# Patient Record
Sex: MALE | Race: OTHER | Employment: UNEMPLOYED | URBAN - NONMETROPOLITAN AREA
[De-identification: names, ages, dates, MRNs, and addresses within clinical notes are randomized per-mention and may not be internally consistent; named-entity substitution may affect disease eponyms.]

---

## 2022-05-02 ENCOUNTER — HOSPITAL ENCOUNTER (EMERGENCY)
Age: 32
Discharge: HOME | End: 2022-05-02
Attending: STUDENT IN AN ORGANIZED HEALTH CARE EDUCATION/TRAINING PROGRAM
Payer: COMMERCIAL

## 2022-05-02 ENCOUNTER — APPOINTMENT (OUTPATIENT)
Dept: GENERAL RADIOLOGY | Age: 32
End: 2022-05-02
Payer: COMMERCIAL

## 2022-05-02 ENCOUNTER — HOSPITAL ENCOUNTER (OUTPATIENT)
Age: 32
Setting detail: OBSERVATION
Discharge: NURSING, REHAB OR LONG-TERM CARE | DRG: 861 | End: 2022-05-04
Attending: INTERNAL MEDICINE | Admitting: INTERNAL MEDICINE
Payer: COMMERCIAL

## 2022-05-02 VITALS
WEIGHT: 220 LBS | SYSTOLIC BLOOD PRESSURE: 122 MMHG | TEMPERATURE: 98.4 F | HEART RATE: 106 BPM | OXYGEN SATURATION: 93 % | HEIGHT: 67 IN | DIASTOLIC BLOOD PRESSURE: 68 MMHG | BODY MASS INDEX: 34.53 KG/M2 | RESPIRATION RATE: 21 BRPM

## 2022-05-02 DIAGNOSIS — R29.898 LEG WEAKNESS, BILATERAL: ICD-10-CM

## 2022-05-02 DIAGNOSIS — T40.601A OPIATE OVERDOSE, ACCIDENTAL OR UNINTENTIONAL, INITIAL ENCOUNTER (HCC): Primary | ICD-10-CM

## 2022-05-02 DIAGNOSIS — N17.9 ACUTE KIDNEY INJURY (HCC): ICD-10-CM

## 2022-05-02 DIAGNOSIS — R77.8 ELEVATED TROPONIN: ICD-10-CM

## 2022-05-02 PROBLEM — E66.9 OBESITY: Status: ACTIVE | Noted: 2022-05-02

## 2022-05-02 PROBLEM — T40.411A ACCIDENTAL FENTANYL OVERDOSE (HCC): Status: ACTIVE | Noted: 2022-05-02

## 2022-05-02 PROBLEM — T40.2X1A OPIOID OVERDOSE (HCC): Status: ACTIVE | Noted: 2022-05-02

## 2022-05-02 PROBLEM — Z72.0 TOBACCO USE: Status: ACTIVE | Noted: 2022-05-02

## 2022-05-02 LAB
A/G RATIO: 1.3 (ref 1.1–2.2)
A/G RATIO: 1.3 (ref 1.1–2.2)
ALBUMIN SERPL-MCNC: 4.3 G/DL (ref 3.4–5)
ALBUMIN SERPL-MCNC: 4.5 G/DL (ref 3.4–5)
ALP BLD-CCNC: 125 U/L (ref 40–129)
ALP BLD-CCNC: 150 U/L (ref 40–129)
ALT SERPL-CCNC: 278 U/L (ref 10–40)
ALT SERPL-CCNC: 319 U/L (ref 10–40)
AMPHETAMINE SCREEN, URINE: NORMAL
ANION GAP SERPL CALCULATED.3IONS-SCNC: 10 MMOL/L (ref 3–16)
ANION GAP SERPL CALCULATED.3IONS-SCNC: 17 MMOL/L (ref 3–16)
AST SERPL-CCNC: 196 U/L (ref 15–37)
AST SERPL-CCNC: 278 U/L (ref 15–37)
BACTERIA: ABNORMAL /HPF
BARBITURATE SCREEN URINE: NORMAL
BASOPHILS ABSOLUTE: 0 K/UL (ref 0–0.2)
BASOPHILS RELATIVE PERCENT: 0.1 %
BENZODIAZEPINE SCREEN, URINE: NORMAL
BILIRUB SERPL-MCNC: 0.3 MG/DL (ref 0–1)
BILIRUB SERPL-MCNC: 0.3 MG/DL (ref 0–1)
BILIRUBIN URINE: NEGATIVE
BLOOD, URINE: ABNORMAL
BUN BLDV-MCNC: 14 MG/DL (ref 7–20)
BUN BLDV-MCNC: 15 MG/DL (ref 7–20)
CALCIUM SERPL-MCNC: 8.9 MG/DL (ref 8.3–10.6)
CALCIUM SERPL-MCNC: 9.1 MG/DL (ref 8.3–10.6)
CANNABINOID SCREEN URINE: NORMAL
CHLORIDE BLD-SCNC: 100 MMOL/L (ref 99–110)
CHLORIDE BLD-SCNC: 96 MMOL/L (ref 99–110)
CLARITY: CLEAR
CO2: 24 MMOL/L (ref 21–32)
CO2: 28 MMOL/L (ref 21–32)
COCAINE METABOLITE SCREEN URINE: NORMAL
COLOR: ABNORMAL
CREAT SERPL-MCNC: 1.2 MG/DL (ref 0.9–1.3)
CREAT SERPL-MCNC: 1.8 MG/DL (ref 0.9–1.3)
EKG ATRIAL RATE: 112 BPM
EKG DIAGNOSIS: NORMAL
EKG P AXIS: 59 DEGREES
EKG P-R INTERVAL: 140 MS
EKG Q-T INTERVAL: 328 MS
EKG QRS DURATION: 86 MS
EKG QTC CALCULATION (BAZETT): 447 MS
EKG R AXIS: 36 DEGREES
EKG T AXIS: 20 DEGREES
EKG VENTRICULAR RATE: 112 BPM
EOSINOPHILS ABSOLUTE: 0 K/UL (ref 0–0.6)
EOSINOPHILS RELATIVE PERCENT: 0.1 %
EPITHELIAL CELLS, UA: ABNORMAL /HPF (ref 0–5)
GFR AFRICAN AMERICAN: 53
GFR AFRICAN AMERICAN: >60
GFR NON-AFRICAN AMERICAN: 44
GFR NON-AFRICAN AMERICAN: >60
GLUCOSE BLD-MCNC: 110 MG/DL (ref 70–99)
GLUCOSE BLD-MCNC: 312 MG/DL (ref 70–99)
GLUCOSE URINE: NEGATIVE MG/DL
HCT VFR BLD CALC: 46.4 % (ref 40.5–52.5)
HEMOGLOBIN: 15.4 G/DL (ref 13.5–17.5)
KETONES, URINE: NEGATIVE MG/DL
LEUKOCYTE ESTERASE, URINE: NEGATIVE
LYMPHOCYTES ABSOLUTE: 0.7 K/UL (ref 1–5.1)
LYMPHOCYTES RELATIVE PERCENT: 6 %
Lab: NORMAL
MCH RBC QN AUTO: 31.7 PG (ref 26–34)
MCHC RBC AUTO-ENTMCNC: 33.2 G/DL (ref 31–36)
MCV RBC AUTO: 95.4 FL (ref 80–100)
METHADONE SCREEN, URINE: NORMAL
MICROSCOPIC EXAMINATION: YES
MONOCYTES ABSOLUTE: 0.2 K/UL (ref 0–1.3)
MONOCYTES RELATIVE PERCENT: 1.5 %
MUCUS: ABNORMAL /LPF
NEUTROPHILS ABSOLUTE: 11.1 K/UL (ref 1.7–7.7)
NEUTROPHILS RELATIVE PERCENT: 92.3 %
NITRITE, URINE: NEGATIVE
OPIATE SCREEN URINE: NORMAL
OXYCODONE URINE: NORMAL
PDW BLD-RTO: 13.8 % (ref 12.4–15.4)
PH UA: 6
PH UA: 6 (ref 5–8)
PHENCYCLIDINE SCREEN URINE: NORMAL
PLATELET # BLD: 147 K/UL (ref 135–450)
PMV BLD AUTO: 11.7 FL (ref 5–10.5)
POTASSIUM REFLEX MAGNESIUM: 4.7 MMOL/L (ref 3.5–5.1)
POTASSIUM REFLEX MAGNESIUM: 5.4 MMOL/L (ref 3.5–5.1)
PROPOXYPHENE SCREEN: NORMAL
PROTEIN UA: 30 MG/DL
RBC # BLD: 4.87 M/UL (ref 4.2–5.9)
RBC UA: ABNORMAL /HPF (ref 0–4)
SARS-COV-2, NAAT: NOT DETECTED
SODIUM BLD-SCNC: 137 MMOL/L (ref 136–145)
SODIUM BLD-SCNC: 138 MMOL/L (ref 136–145)
SPECIFIC GRAVITY UA: >=1.03 (ref 1–1.03)
TOTAL CK: 175 U/L (ref 39–308)
TOTAL PROTEIN: 7.7 G/DL (ref 6.4–8.2)
TOTAL PROTEIN: 8 G/DL (ref 6.4–8.2)
TROPONIN: 0.1 NG/ML
TROPONIN: 0.13 NG/ML
URINE TYPE: ABNORMAL
UROBILINOGEN, URINE: 1 E.U./DL
WBC # BLD: 12.1 K/UL (ref 4–11)
WBC UA: ABNORMAL /HPF (ref 0–5)

## 2022-05-02 PROCEDURE — 96360 HYDRATION IV INFUSION INIT: CPT

## 2022-05-02 PROCEDURE — G0379 DIRECT REFER HOSPITAL OBSERV: HCPCS

## 2022-05-02 PROCEDURE — 72100 X-RAY EXAM L-S SPINE 2/3 VWS: CPT

## 2022-05-02 PROCEDURE — 81001 URINALYSIS AUTO W/SCOPE: CPT

## 2022-05-02 PROCEDURE — 99285 EMERGENCY DEPT VISIT HI MDM: CPT

## 2022-05-02 PROCEDURE — 1200000000 HC SEMI PRIVATE

## 2022-05-02 PROCEDURE — 2580000003 HC RX 258: Performed by: STUDENT IN AN ORGANIZED HEALTH CARE EDUCATION/TRAINING PROGRAM

## 2022-05-02 PROCEDURE — 84484 ASSAY OF TROPONIN QUANT: CPT

## 2022-05-02 PROCEDURE — 80053 COMPREHEN METABOLIC PANEL: CPT

## 2022-05-02 PROCEDURE — 85025 COMPLETE CBC W/AUTO DIFF WBC: CPT

## 2022-05-02 PROCEDURE — 36415 COLL VENOUS BLD VENIPUNCTURE: CPT

## 2022-05-02 PROCEDURE — G0378 HOSPITAL OBSERVATION PER HR: HCPCS

## 2022-05-02 PROCEDURE — 6370000000 HC RX 637 (ALT 250 FOR IP): Performed by: STUDENT IN AN ORGANIZED HEALTH CARE EDUCATION/TRAINING PROGRAM

## 2022-05-02 PROCEDURE — 87635 SARS-COV-2 COVID-19 AMP PRB: CPT

## 2022-05-02 PROCEDURE — 93005 ELECTROCARDIOGRAM TRACING: CPT | Performed by: STUDENT IN AN ORGANIZED HEALTH CARE EDUCATION/TRAINING PROGRAM

## 2022-05-02 PROCEDURE — 73502 X-RAY EXAM HIP UNI 2-3 VIEWS: CPT

## 2022-05-02 PROCEDURE — 82550 ASSAY OF CK (CPK): CPT

## 2022-05-02 PROCEDURE — 80307 DRUG TEST PRSMV CHEM ANLYZR: CPT

## 2022-05-02 RX ORDER — ENOXAPARIN SODIUM 100 MG/ML
40 INJECTION SUBCUTANEOUS DAILY
Status: DISCONTINUED | OUTPATIENT
Start: 2022-05-03 | End: 2022-05-04 | Stop reason: HOSPADM

## 2022-05-02 RX ORDER — ACETAMINOPHEN 650 MG/1
650 SUPPOSITORY RECTAL EVERY 6 HOURS PRN
Status: DISCONTINUED | OUTPATIENT
Start: 2022-05-02 | End: 2022-05-04 | Stop reason: HOSPADM

## 2022-05-02 RX ORDER — ACETAMINOPHEN 325 MG/1
650 TABLET ORAL EVERY 6 HOURS PRN
Status: DISCONTINUED | OUTPATIENT
Start: 2022-05-02 | End: 2022-05-04 | Stop reason: HOSPADM

## 2022-05-02 RX ORDER — SODIUM CHLORIDE 0.9 % (FLUSH) 0.9 %
5-40 SYRINGE (ML) INJECTION PRN
Status: DISCONTINUED | OUTPATIENT
Start: 2022-05-02 | End: 2022-05-04 | Stop reason: HOSPADM

## 2022-05-02 RX ORDER — ONDANSETRON 2 MG/ML
4 INJECTION INTRAMUSCULAR; INTRAVENOUS EVERY 6 HOURS PRN
Status: DISCONTINUED | OUTPATIENT
Start: 2022-05-02 | End: 2022-05-04 | Stop reason: HOSPADM

## 2022-05-02 RX ORDER — SODIUM CHLORIDE 0.9 % (FLUSH) 0.9 %
5-40 SYRINGE (ML) INJECTION EVERY 12 HOURS SCHEDULED
Status: DISCONTINUED | OUTPATIENT
Start: 2022-05-03 | End: 2022-05-04 | Stop reason: HOSPADM

## 2022-05-02 RX ORDER — SODIUM CHLORIDE 9 MG/ML
INJECTION, SOLUTION INTRAVENOUS CONTINUOUS
Status: ACTIVE | OUTPATIENT
Start: 2022-05-03 | End: 2022-05-03

## 2022-05-02 RX ORDER — POLYETHYLENE GLYCOL 3350 17 G/17G
17 POWDER, FOR SOLUTION ORAL DAILY PRN
Status: DISCONTINUED | OUTPATIENT
Start: 2022-05-02 | End: 2022-05-04 | Stop reason: HOSPADM

## 2022-05-02 RX ORDER — ASPIRIN 81 MG/1
324 TABLET, CHEWABLE ORAL ONCE
Status: COMPLETED | OUTPATIENT
Start: 2022-05-02 | End: 2022-05-02

## 2022-05-02 RX ORDER — 0.9 % SODIUM CHLORIDE 0.9 %
1000 INTRAVENOUS SOLUTION INTRAVENOUS ONCE
Status: COMPLETED | OUTPATIENT
Start: 2022-05-02 | End: 2022-05-02

## 2022-05-02 RX ORDER — SODIUM CHLORIDE 9 MG/ML
INJECTION, SOLUTION INTRAVENOUS PRN
Status: DISCONTINUED | OUTPATIENT
Start: 2022-05-02 | End: 2022-05-04

## 2022-05-02 RX ORDER — NICOTINE 21 MG/24HR
1 PATCH, TRANSDERMAL 24 HOURS TRANSDERMAL DAILY
Status: DISCONTINUED | OUTPATIENT
Start: 2022-05-03 | End: 2022-05-04 | Stop reason: HOSPADM

## 2022-05-02 RX ORDER — ONDANSETRON 4 MG/1
4 TABLET, ORALLY DISINTEGRATING ORAL EVERY 8 HOURS PRN
Status: DISCONTINUED | OUTPATIENT
Start: 2022-05-02 | End: 2022-05-04 | Stop reason: HOSPADM

## 2022-05-02 RX ADMIN — SODIUM CHLORIDE 1000 ML: 9 INJECTION, SOLUTION INTRAVENOUS at 17:04

## 2022-05-02 RX ADMIN — ASPIRIN 81 MG 324 MG: 81 TABLET ORAL at 17:05

## 2022-05-02 ASSESSMENT — PAIN SCALES - GENERAL: PAINLEVEL_OUTOF10: 7

## 2022-05-02 ASSESSMENT — PAIN - FUNCTIONAL ASSESSMENT: PAIN_FUNCTIONAL_ASSESSMENT: NONE - DENIES PAIN

## 2022-05-02 ASSESSMENT — PAIN DESCRIPTION - LOCATION: LOCATION: LEG

## 2022-05-02 ASSESSMENT — PAIN DESCRIPTION - ORIENTATION: ORIENTATION: RIGHT;LEFT

## 2022-05-02 NOTE — ED NOTES
Attempted to assist pt to lobby to be picked up by [facility] representative. Pt states, \"I can't walk\". Attempted to help pt walk but pt continues to sit in the floor. MD made aware. MD states she will place further orders.      Marielle Chan RN  05/02/22 0911

## 2022-05-02 NOTE — ED PROVIDER NOTES
[address] [place]    CHIEF COMPLAINT  Drug Overdose (pt states he took unknown amount of Fentanyl today in an attempt to get high, denies SI, found unresponsive, given 3.5 of Narcan by EMS, states he has appt with [facility] at 3pm today)     [address] Sanjeev Richardson is a 32 y.o. male  who presents to the ED complaining of fentanyl overdose. Patient states that he took an unknown amount of fentanyl in an attempt to get high. He denies any attempt at self-harm or suicidal ideation. He was found unresponsive was given 3-1/2 of Narcan by EMS. He denies any other drug use or coingestions. He denies any other complaints or concerns. No other complaints, modifying factors or associated symptoms. I have reviewed the following from the nursing documentation. History reviewed. No pertinent past medical history. History reviewed. No pertinent surgical history. History reviewed. No pertinent family history. Social History     Socioeconomic History    Marital status: Legally      Spouse name: Not on file    Number of children: Not on file    Years of education: Not on file    Highest education level: Not on file   Occupational History    Not on file   Tobacco Use    Smoking status: Current Every Day Smoker    Smokeless tobacco: Never Used   Substance and Sexual Activity    Alcohol use: Not Currently    Drug use: Yes     Comment: Fentanyl today 5/2/22    Sexual activity: Not on file   Other Topics Concern    Not on file   Social History Narrative    Not on file     Social Determinants of Health     Financial Resource Strain:     Difficulty of Paying Living Expenses: Not on file   Food Insecurity:     Worried About Running Out of Food in the Last Year: Not on file    Diego of Food in the Last Year: Not on file   Transportation Needs:     Lack of Transportation (Medical): Not on file    Lack of Transportation (Non-Medical):  Not on file   Physical Activity:     Days of Exercise per Week: Not on file    Minutes of Exercise per Session: Not on file   Stress:     Feeling of Stress : Not on file   Social Connections:     Frequency of Communication with Friends and Family: Not on file    Frequency of Social Gatherings with Friends and Family: Not on file    Attends Gnosticism Services: Not on file    Active Member of [address] [place] [address] [employer] or Organizations: Not on file    Attends Club or Organization Meetings: Not on file    Marital Status: Not on file   Intimate Partner Violence:     Fear of Current or Ex-Partner: Not on file    Emotionally Abused: Not on file    Physically Abused: Not on file    Sexually Abused: Not on file   Housing Stability:     Unable to Pay for Housing in the Last Year: Not on file    Number of [place] in the Last Year: Not on file    Unstable Housing in the Last Year: Not on file     No current facility-administered medications for this encounter. Current Outpatient Medications   Medication Sig Dispense Refill    Naloxone HCl (NALOXONE OPIATE OVERDOSE KIT) 1 each by Nasal route once for 1 dose 1 kit 0     No Known Allergies    REVIEW OF SYSTEMS  10 systems reviewed, pertinent positives per HPI otherwise noted to be negative. PHYSICAL EXAM  /66   Pulse 93   Temp 98.4 °F (36.9 °C) (Oral)   Resp 16   Ht 5' 7\" (1.702 m)   Wt 220 lb (99.8 kg)   SpO2 93%   BMI 34.46 kg/m²    GENERAL APPEARANCE: Awake and alert. Cooperative. No acute distress. HENT: Normocephalic. Atraumatic. NECK: Supple. EYES: PERRL. EOM's grossly intact. HEART/CHEST: Tachycardic. No murmurs. LUNGS: Respirations unlabored. CTAB. Good air exchange. Speaking comfortably in full sentences. ABDOMEN: No tenderness. Soft. Non-distended. No masses. No organomegaly. No guarding or rebound. MUSCULOSKELETAL: No extremity edema. Compartments soft. No deformity. No tenderness in the extremities. All extremities neurovascularly intact.   SKIN: Warm and dry. No acute rashes. NEUROLOGICAL: Alert and oriented. CN's 2-12 intact. No gross facial drooping. Strength 5/5, sensation intact. Gait normal.  PSYCHIATRIC: Normal mood and affect. LABS  I have reviewed all labs for this visit. Results for orders placed or performed during the hospital encounter of 05/02/22   EKG 12 Lead   Result Value Ref Range    Ventricular Rate 112 BPM    Atrial Rate 112 BPM    P-R Interval 140 ms    QRS Duration 86 ms    Q-T Interval 328 ms    QTc Calculation (Bazett) 447 ms    P Axis 59 degrees    R Axis 36 degrees    T Axis 20 degrees    Diagnosis Sinus tachycardiaOtherwise normal ECG        ECG  The Ekg interpreted by me shows  sinus tachycardia, [id number]  Axis is   Normal  QTc is  normal  Intervals and Durations are unremarkable. ST Segments: nonspecific changes, nonspecific T wave inversion in lead III  No previous available for comparison    RADIOLOGY  No orders to display     ED COURSE/MDM  Patient seen and evaluated. Old records reviewed. Labs and imaging reviewed and results discussed with patient. Patient is a 57-year-old male brought in with concerns for unintentional fentanyl overdose. Full HPI as detailed above. Upon arrival in the ED, vitals reassuring. Patient is resting comfortably and is in no acute distress. Did receive Narcan prior to arrival.  He denies any attempts at self-harm or suicidal thoughts. EKG was performed that showed sinus tachycardia, otherwise reassuring. No evidence of acute ischemia or dysrhythmias. Patient denies any coingestions. He was monitored for several hours after Narcan administration and did not require any additional repeat doses. Initial plan was for discharge, however when patient was given his discharge paperwork he stated that he was unable to ambulate because he felt like his \"legs were asleep. \"  His neurologic exam is normal.  However, given his complaints of weakness in his legs, work-up was obtained that did reveal an initial troponin of 0.1, with a repeat of 0.13. Also found to have elevated creatinine of 1.8 that did decrease to 1.2 after fluid administration. CK is in the normal range at 175. Given his elevated troponin, and subjective weakness of his lower extremity/inability to walk, patient will be transferred for further work-up and treatment of his condition. He is comfortable in agreement with plan of care. During the patient's ED course, the patient was given:  Medications - No data to display     CLINICAL IMPRESSION  1. Opiate overdose, accidental or unintentional, initial encounter ([facility] [address]        Blood pressure 117/66, pulse 93, temperature 98.4 °F (36.9 °C), temperature source Oral, resp. rate 16, height 5' 7\" (1.702 m), weight 220 lb (99.8 kg), SpO2 93 %. DISPOSITION  Roxene Babinski was transferred to [facility] in stable condition. Patient was given scripts for the following medications. I counseled patient how to take these medications. New Prescriptions    NALOXONE HCL (NALOXONE OPIATE OVERDOSE KIT)    1 each by Nasal route once for 1 dose       Follow-up with:  Erica Alves  [phone]  Schedule an appointment as soon as possible for a visit       [place] [address]. [facility] Emergency Department  [address] [facility],[address]  [phone]  Go to   If symptoms worsen      DISCLAIMER: This chart was created using Dragon dictation software. Efforts were made by me to ensure accuracy, however some errors may be present due to limitations of this technology and occasionally words are not transcribed correctly.        Sailaja Arevalo MD  05/02/22 Taniya [address] Hieu Colindres MD  05/02/22 7650

## 2022-05-03 ENCOUNTER — APPOINTMENT (OUTPATIENT)
Dept: GENERAL RADIOLOGY | Age: 32
DRG: 861 | End: 2022-05-03
Attending: INTERNAL MEDICINE
Payer: COMMERCIAL

## 2022-05-03 PROBLEM — N17.9 AKI (ACUTE KIDNEY INJURY) (HCC): Status: ACTIVE | Noted: 2022-05-03

## 2022-05-03 LAB
ALBUMIN SERPL-MCNC: 3.7 G/DL (ref 3.4–5)
ALP BLD-CCNC: 109 U/L (ref 40–129)
ALT SERPL-CCNC: 272 U/L (ref 10–40)
ANION GAP SERPL CALCULATED.3IONS-SCNC: 8 MMOL/L (ref 3–16)
AST SERPL-CCNC: 305 U/L (ref 15–37)
BASOPHILS ABSOLUTE: 0 K/UL (ref 0–0.2)
BASOPHILS RELATIVE PERCENT: 0.3 %
BILIRUB SERPL-MCNC: 0.3 MG/DL (ref 0–1)
BILIRUBIN DIRECT: <0.2 MG/DL (ref 0–0.3)
BILIRUBIN, INDIRECT: ABNORMAL MG/DL (ref 0–1)
BUN BLDV-MCNC: 9 MG/DL (ref 7–20)
CALCIUM SERPL-MCNC: 9.2 MG/DL (ref 8.3–10.6)
CHLORIDE BLD-SCNC: 102 MMOL/L (ref 99–110)
CO2: 31 MMOL/L (ref 21–32)
CREAT SERPL-MCNC: 0.9 MG/DL (ref 0.9–1.3)
EOSINOPHILS ABSOLUTE: 0.1 K/UL (ref 0–0.6)
EOSINOPHILS RELATIVE PERCENT: 0.6 %
GFR AFRICAN AMERICAN: >60
GFR NON-AFRICAN AMERICAN: >60
GLUCOSE BLD-MCNC: 88 MG/DL (ref 70–99)
HAV IGM SER IA-ACNC: ABNORMAL
HCT VFR BLD CALC: 42.2 % (ref 40.5–52.5)
HEMOGLOBIN: 14.2 G/DL (ref 13.5–17.5)
HEPATITIS B CORE IGM ANTIBODY: ABNORMAL
HEPATITIS B SURFACE ANTIGEN INTERPRETATION: ABNORMAL
HEPATITIS C ANTIBODY INTERPRETATION: REACTIVE
LYMPHOCYTES ABSOLUTE: 3.3 K/UL (ref 1–5.1)
LYMPHOCYTES RELATIVE PERCENT: 37.9 %
MCH RBC QN AUTO: 31.8 PG (ref 26–34)
MCHC RBC AUTO-ENTMCNC: 33.7 G/DL (ref 31–36)
MCV RBC AUTO: 94.3 FL (ref 80–100)
MONOCYTES ABSOLUTE: 0.6 K/UL (ref 0–1.3)
MONOCYTES RELATIVE PERCENT: 7.4 %
NEUTROPHILS ABSOLUTE: 4.6 K/UL (ref 1.7–7.7)
NEUTROPHILS RELATIVE PERCENT: 53.8 %
PDW BLD-RTO: 13.3 % (ref 12.4–15.4)
PLATELET # BLD: 108 K/UL (ref 135–450)
PMV BLD AUTO: 10.1 FL (ref 5–10.5)
POTASSIUM REFLEX MAGNESIUM: 4.6 MMOL/L (ref 3.5–5.1)
RBC # BLD: 4.48 M/UL (ref 4.2–5.9)
SODIUM BLD-SCNC: 141 MMOL/L (ref 136–145)
TOTAL PROTEIN: 7 G/DL (ref 6.4–8.2)
TROPONIN: 0.05 NG/ML
TROPONIN: 0.06 NG/ML
WBC # BLD: 8.6 K/UL (ref 4–11)

## 2022-05-03 PROCEDURE — 97162 PT EVAL MOD COMPLEX 30 MIN: CPT

## 2022-05-03 PROCEDURE — 71045 X-RAY EXAM CHEST 1 VIEW: CPT

## 2022-05-03 PROCEDURE — 2580000003 HC RX 258: Performed by: NURSE PRACTITIONER

## 2022-05-03 PROCEDURE — 96372 THER/PROPH/DIAG INJ SC/IM: CPT

## 2022-05-03 PROCEDURE — 6360000002 HC RX W HCPCS: Performed by: NURSE PRACTITIONER

## 2022-05-03 PROCEDURE — 87522 HEPATITIS C REVRS TRNSCRPJ: CPT

## 2022-05-03 PROCEDURE — 80048 BASIC METABOLIC PNL TOTAL CA: CPT

## 2022-05-03 PROCEDURE — 97166 OT EVAL MOD COMPLEX 45 MIN: CPT

## 2022-05-03 PROCEDURE — 85025 COMPLETE CBC W/AUTO DIFF WBC: CPT

## 2022-05-03 PROCEDURE — 36415 COLL VENOUS BLD VENIPUNCTURE: CPT

## 2022-05-03 PROCEDURE — G0378 HOSPITAL OBSERVATION PER HR: HCPCS

## 2022-05-03 PROCEDURE — 97116 GAIT TRAINING THERAPY: CPT

## 2022-05-03 PROCEDURE — 84484 ASSAY OF TROPONIN QUANT: CPT

## 2022-05-03 PROCEDURE — 97535 SELF CARE MNGMENT TRAINING: CPT

## 2022-05-03 PROCEDURE — 6370000000 HC RX 637 (ALT 250 FOR IP): Performed by: NURSE PRACTITIONER

## 2022-05-03 PROCEDURE — 80076 HEPATIC FUNCTION PANEL: CPT

## 2022-05-03 PROCEDURE — 80074 ACUTE HEPATITIS PANEL: CPT

## 2022-05-03 RX ADMIN — SODIUM CHLORIDE, PRESERVATIVE FREE 10 ML: 5 INJECTION INTRAVENOUS at 21:30

## 2022-05-03 RX ADMIN — SODIUM CHLORIDE: 9 INJECTION, SOLUTION INTRAVENOUS at 00:10

## 2022-05-03 RX ADMIN — ENOXAPARIN SODIUM 40 MG: 100 INJECTION SUBCUTANEOUS at 08:50

## 2022-05-03 RX ADMIN — ASPIRIN 325 MG: 325 TABLET, COATED ORAL at 00:15

## 2022-05-03 ASSESSMENT — PAIN SCALES - GENERAL: PAINLEVEL_OUTOF10: 0

## 2022-05-03 NOTE — PROGRESS NOTES
Pt assessment completed and charted. VSS. Pt a/ox4. Pt still reports numbness in his leg, can feel when you touch the leg. Pt able to stand at bedside and use urinal but needs assistance when ambulating. Pt denies any other needs at this time. Pt calls out appropriately. Pt is a fall risk;  -Bed in lowest position and wheels locked. -Call light within reach.   -Bedside table within reach.   -Non-skid footwear in place.

## 2022-05-03 NOTE — CONSULTS
Consult placed  Added to the treatment team  Who:Dr. Tish Howell  Date:5/3/2022,  Time:2:43 PM        Electronically signed by Dakota Kohli on 5/3/2022 at 2:43 PM

## 2022-05-03 NOTE — CARE COORDINATION
Therapy with recs for IPREHAB. Spoke with charanjit Patricia to speak with patient. Patient agreeable to [facility] any location. , referral to ARU. Spoke with Jose Moon, giovanny at [facility], [phone]. Stated will keep bed available for patient. He is in contact with patients . Patient updated and informed to keep in contact with [facility]. Waiting ARU determination. Aware medically ready. Karissa San RN  ARU can accept will initiate cert. Aware of above plans with [facility].  Anthony Espinoza RN

## 2022-05-03 NOTE — ED NOTES
The transfer center called back with a bed at . [place] [address] bed 1 report can be call to [phone]     Bina Maza  05/02/22 2126

## 2022-05-03 NOTE — CARE COORDINATION
CASE MANAGEMENT INITIAL ASSESSMENT      Reviewed chart and completed assessment with patient:bedside  Order: Assistance with Discharge Planning  Family present: none  Explained Case Management role/services. Primary contact information:[facility] Decision Maker :   Primary Decision Maker: Chely Lundberg - [phone]          Can this person be reached and be able to respond quickly, such as within a few minutes or hours? Yes      Admit date/status:5/2/22  Diagnosis:opioid overdose   Is this a Readmission?:  No      Insurance:caresource   Precert required for SNF: Yes       3 night stay required: No    Living arrangements, Adls, care needs, prior to admission: currently at [facility] in [place]. Planned on admitting to [facility] yesterday did not make it. Durable Medical Equipment at home:  Walker__Cane__RTS__ BSC__Shower Chair__  02__ HHN__ CPAP__  BiPap__  Hospital Bed__ W/C___ Other_____    Services in the home and/or outpatient, prior to admission:none    Current PCP:none provided PCP list for if stays in this area                                Medications Prescription coverage? yes    Transportation needs: likely need cab     PT/OT recs:pending    Hospital Exemption Notification (HEN):needed for SNF    Barriers to discharge:none    Plan/comments:spoke with patient. Reported from [facility] originally. Had been kicked out of prior drug treatment placement. + plan for admission to [facility] for continued treatment. Message left with [facility] to see if still has a bed available. Was supposed to admit there yesterday. Belongings currently at [facility]. Therapy has seen awaiting recs. Following.  Vilma Fournier RN       ECOC on chart for MD signature

## 2022-05-03 NOTE — CARE COORDINATION
[facility] - Acute Rehab Unit   After review, this patient is felt to be:       []  Appropriate for Acute Inpatient Rehab    [x]  Appropriate for Acute Inpatient Rehab Pending Insurance Authorization    []  Not appropriate for Acute Inpatient Rehab    []  Referral received and ARU reviewing patient; Evaluation ongoing. Precert initiated with [facility]. Will notify DCP with further updates.  Thank you for the referral.    Ryan Day  #[id number]  Speech-Language Pathologist  [address] [place] [address] Unit  Desk: [phone]

## 2022-05-03 NOTE — PROGRESS NOTES
Occupational Therapy  Facility/Department: [facility] C3 TELE/MED SURG/ONC  Occupational Therapy Initial Assessment    Name: Janie Snell  : 1990  MRN: [id number]  Date of Service: 5/3/2022    Discharge Recommendations:  IP Rehab  OT Equipment Recommendations  Other: CTA - at current level of function RW. Possible shower chair. Patient Diagnosis(es): There were no encounter diagnoses. Past Medical History:  has no past medical history on file. Past Surgical History:  has no past surgical history on file. Assessment   Performance deficits / Impairments: Decreased functional mobility ; Decreased ADL status; Decreased safe awareness;Decreased cognition;Decreased balance;Decreased sensation;Decreased strength;Decreased ROM (Decreased strength and ROM in LE's, not UE, but impacting function.)    Assessment: Pt is a 27yo male with deficits in the areas listed above after an opioid overdose resulting in LE weakness and decreased sensation and the deficits listed above. Pt typically is (I) at baseline with ADLS and functional mobility and lives in transitional housing. Today, Pt initially Ax2 for functional mobility and t/fs d/t decreased balance and weakness but progressing to min Ax1 with RW with more OOB activity. Pt required min-CGA with OOB ADLS and required vc's for safety and RW use throughout session. Pt would continue to benefit from skilled OT services to increase balance, safety, strength and independence in ADLS and functional mobility. Recommend IPR at d/c to address above deficits and improve function.     Prognosis: Good  Decision Making: Medium Complexity  REQUIRES OT FOLLOW-UP: Yes  Activity Tolerance  Activity Tolerance: Patient Tolerated treatment well        Plan   Plan  Times per Week: 3-5x/week  Current Treatment Recommendations: Strengthening,Balance training,Functional mobility training,Safety education & training,Equipment evaluation, education, & procurement,Self-Care / ADL,Patient/Caregiver education & training     Restrictions  Restrictions/Precautions  Restrictions/Precautions: Up as Tolerated  Position Activity Restriction  Other position/activity restrictions: IV    Subjective   General  Chart Reviewed: Yes  Patient assessed for rehabilitation services?: Yes  Response to previous treatment: Patient with no complaints from previous session  Family / Caregiver Present: No  Referring Practitioner: STEVE Mahoney CNP  Diagnosis: Unresponsiveness in setting of fentanyl overdose  Subjective  Subjective: Pt in bed and agreeable to therapy. General Comment  Comments: RN approved therapy. Pt reporting 8/10 discomfort in B/L LE with greater discomfort in LLE. Intervention: increased activity. Pt still experiencing discomfort. Social/Functional History  Social/Functional History  ADL Assistance: Independent  Homemaking Assistance: Independent  Ambulation Assistance: Independent  Transfer Assistance: Independent  Active : No  Occupation: Unemployed  Additional Comments: Pt reports that he would most likely go to the [facility] in [place] if he were to leave [facility]. Pt has been in a transitional home for ~9 months. Pt is unsure of the layout of the home. Objective   Pulse: 83  Heart Rate Source: Monitor  BP: (!) 157/76  BP Location: Left upper arm  Patient Position: Semi fowlers  MAP (Calculated): 103  Resp: 16  SpO2: 97 %  O2 Device: None (Room air)  Vision Exceptions: Wears glasses at all times  Hearing: Within functional limits          Safety Devices  Type of Devices: Gait belt;Bed alarm in place;Call light within reach;Nurse notified; Left in bed     Bed Mobility Training  Bed Mobility Training: Yes  Overall Level of Assistance: Supervision  Supine to Sit: Supervision (HOB elevated.)  Sit to Supine: Supervision  Scooting: Supervision    Balance  Sitting: Intact  Standing: Impaired  Standing - Static: Poor; Fair (Pt with 1 LOB requiring max+min Ax2 to correct with no AD standing at EOB. Pt CGA-min A in stance for toileting.)  Standing - Dynamic: Constant support;Poor; Fair (Pt initially mod+min Ax2 with RW and progressing to min Ax1 with RW. Activities: in room functional mobility to prepare for household distances, toileting, to/from restroom, hand hygiene. Time: ~2minutes, ~ 2.5minutes. VC's for RW use.)    Transfer Training  Transfer Training: Yes  Sit to Stand: Assist X2;Maximum assistance;Minimum assistance (Pt initially CGA but progressing to max A+min A x2 with no AD d/t LOB. Pt CGA with vc's for safe t/fs with RW.)  Stand to Sit: Assist X2;Maximum assistance;Minimum assistance (Pt initially CGA but progressing to max A+min A x2 with no AD d/t LOB. Pt CGA with vc's for safe t/fs with RW.)     AROM: Within functional limits  PROM: Within functional limits  Strength: Within functional limits  Coordination: Within functional limits  Tone: Normal  Sensation: Impaired (Pt reports N/T in B/L LE, proximal and distal. Pt with great loss of sensation in LLE.)     ADL  Grooming: Minimal assistance (CGA-Juancho to wash hands in stance at sink. Assistance with balance. Vc's for safety with RW.)  Toileting: Minimal assistance (CGA-min A in stance for toileting with LB clothing managment. Assistance with balance and vc's for safety with RW and IV.)                 Cognition  Overall Cognitive Status: Exceptions  Arousal/Alertness: Appropriate responses to stimuli  Following Commands: Follows one step commands with increased time; Follows one step commands with repetition  Attention Span: Appears intact  Memory: Appears intact  Safety Judgement: Decreased awareness of need for assistance;Decreased awareness of need for safety  Problem Solving: Decreased awareness of errors;Assistance required to identify errors made  Insights: Decreased awareness of deficits  Initiation: Does not require cues  Sequencing: Requires cues for some  Cognition Comment: Pt impulsive with decreased safety awareness. Education Given To: Patient  Education Provided: Role of Therapy;Plan of Care;Transfer Training  Education Provided Comments: disease specific: RW use, safety with decreased sensation in B/L LE, general safety during hospitalization, having staff present during mobility, d/c recommendations, POC. Education Method: Verbal  Barriers to Learning: Cognition  Education Outcome: Continued education needed;Verbalized understanding;Demonstrated understanding        AM-PAC Score        AM-PAC Inpatient Daily Activity Raw Score: 17 (05/03/22 1016)  AM-PAC Inpatient ADL T-Scale Score : 37.26 (05/03/22 1016)  ADL Inpatient CMS 0-100% Score: 50.11 (05/03/22 1016)  ADL Inpatient CMS G-Code Modifier : CK (05/03/22 1016)    Goals  Short Term Goals  Time Frame for Short term goals: 1 week (5/10) unless stated otherwise. Short Term Goal 1: Pt will LB dress with min A and AD PRN. Short Term Goal 2: Pt will perform bathroom mobility with SBA and AD PRN. Short Term Goal 3: Pt will perform functional/toilet t/fs with SBA and AD PRN. Short Term Goal 4: Pt will toilet with CGA and AD PRN (5/8). Patient Goals   Patient goals : \"To be able to walk without the walker\"       Therapy Time   Individual Concurrent Group Co-treatment   Time In 0926         Time Out 0950         Minutes 24         Timed Code Treatment Minutes: 14 Minutes (10minute evaluation)       Joceline William OTR/L  If pt discharges prior to next session, this note will serve as discharge summary. See case management note for discharge disposition.

## 2022-05-03 NOTE — PROGRESS NOTES
Pt arrived from [place] [address]. [place]. Oriented to room and call light. Tana Colindres CNP notified of pt's arrival.      Admission assessment complete as charted. - a/o x 4, VSS  - c/o bilateral leg weakness, urinal at bedside  - pain 7/10 in BLE  - lungs CTA in all lobes  - BS active x 4, last BM 05/01/2022    Nonskid footwear on. Bed alarm on. Bed in low position, wheels locked, SR x 2, call light and bedside table within reach. Awaiting admission orders.       Electronically signed by Rebekah Reyes RN on 5/2/2022 at 11:32 PM

## 2022-05-03 NOTE — PROGRESS NOTES
4 Eyes Skin Assessment     The patient is being assess for  Admission    I agree that 2 RN's have performed a thorough Head to Toe Skin Assessment on the patient. ALL assessment sites listed below have been assessed. Areas assessed by both nurses: Nellie Vee / Lexa Pock  [x]   Head, Face, and Ears   [x]   Shoulders, Back, and Chest  [x]   Arms, Elbows, and Hands   [x]   Coccyx, Sacrum, and IschIum  [x]   Legs, Feet, and Heels        Does the Patient have Skin Breakdown?   No         Juan Ramon Prevention initiated:  No   Wound Care Orders initiated:  No      WOC nurse consulted for Pressure Injury (Stage 3,4, Unstageable, DTI, NWPT, and Complex wounds), New and Established Ostomies:  No      Nurse 1 eSignature: Electronically signed by Brian Hunter RN on 5/2/22 at 11:33 PM EDT    **SHARE this note so that the co-signing nurse is able to place an eSignature**    Nurse 2 eSignature: Electronically signed by Letitia Cleaning RN on 5/3/22 at 1:13 AM EDT

## 2022-05-03 NOTE — H&P
Hospital Medicine History & Physical      PCP: No primary care provider on file. Date of Admission: 5/2/2022    Date of Service: Pt seen/examined on 5/2/2022 and Admitted to Inpatient with expected LOS greater than two midnights due to medical therapy. Chief Complaint:  Unresponsiveness     History Of Present Illness:      32 y.o. male, with PMH of opiate abuse, tobacco use and obesity, who was a direct admit from [facility] to [facility] with a fentanyl overdose. History obtained from the patient and review of EMR. The patient stated he snorted an unknown amount of fentanyl today to get high and he \"fell out\". He was found unresponsive and given 3.5mg narcan by EMS. The patient denies any other drug use and/or intent to harm self. Per EMR, the patient was going to be discharged from [facility], but upon receiving his discharge papers he stated he was unable to walk. He stated he felt like his \"legs were asleep\". The patient stated he was told he was \"laying on them funny\" when he was found. Upon further work up the patient was found to have an WANDA with cr of 1.8 and a troponin of 0.10. he was given IVF and his creatinine decreased to baseline of 1.2, but troponin increased to  0.13. the patients CK was checked and resulted at 175. D/t his elevated troponin, the patient was ultimately transferred to [facility] for further evaluation and treatment. He denies any chest pain and/or shortness of breath. troponins will be trended and case management has been consulted for assistance with discharge. The patient denied any other associated symptoms as well as any aggravating and/or alleviating factors. At the time of this assessment, the patient was resting comfortably in bed. He currently denies any chest pain, back pain, abdominal pain, shortness of breath, numbness, tingling, N/V/C/D, fever and/or chills. Past Medical History:      No past medical history on file.     Past Surgical History:      No past surgical history on file. Medications Prior to Admission:      Prior to Admission medications    Medication Sig Start Date End Date Taking? Authorizing Provider   Naloxone HCl (NALOXONE OPIATE OVERDOSE KIT) 1 each by Nasal route once for 1 dose 5/2/22 5/2/22  Mark Shepherd MD     Allergies:  Patient has no known allergies. Social History:      The patient currently lives at home    TOBACCO:   reports that he has been smoking. He has never used smokeless tobacco.  ETOH:   reports previous alcohol use. E-Cigarettes/Vaping Use     Questions Responses    E-Cigarette/Vaping Use     Start Date     Passive Exposure     Quit Date     Counseling Given     Comments         Family History:      No family history on file. REVIEW OF SYSTEMS COMPLETED:   Pertinent positives as noted in the HPI. All other systems reviewed and negative. PHYSICAL EXAM PERFORMED:    BP (!) 161/86   Pulse 77   Temp 98.8 °F (37.1 °C) (Oral)   Resp 18     General appearance:  Pleasant, obese male in no apparent distress, appears stated age and cooperative. HEENT:  Pupils equal, round, and reactive to light. Extra ocular muscles intact. Conjunctivae/corneas clear. Neck: Supple, with full range of motion. No jugular venous distention. Trachea midline. Respiratory:  Normal respiratory effort. Clear to auscultation, bilaterally without Rales/Wheezes/Rhonchi. Cardiovascular:  Regular rate and rhythm with normal S1/S2 without murmurs, rubs or gallops. Abdomen: Soft, obese, round non-tender, non-distended with normal bowel sounds. Musculoskeletal:  No clubbing, cyanosis or edema bilaterally. Full range of motion without deformity. Skin: Skin color, texture, turgor normal.  No significant rashes or lesions. Neurologic:  Neurovascularly intact.  Cranial nerves: II-XII intact, grossly non-focal.  Psychiatric:  Alert and oriented, thought content appropriate, normal insight  Capillary Refill: Brisk,3 seconds, normal  Peripheral Pulses: +2 palpable, equal bilaterally     Labs:     Recent Labs     05/02/22  1237   WBC 12.1*   HGB 15.4   HCT 46.4        Recent Labs     05/02/22  1237 05/02/22  1700    138   K 5.4* 4.7   CL 96* 100   CO2 24 28   BUN 15 14   CREATININE 1.8* 1.2   CALCIUM 9.1 8.9     Recent Labs     05/02/22  1237 05/02/22  1700   * 278*   * 319*   BILITOT 0.3 0.3   ALKPHOS 150* 125     Recent Labs     05/02/22  1237 05/02/22  1700   CKTOTAL 175  --    TROPONINI 0.10* 0.13*     Urinalysis:      Lab Results   Component Value Date    NITRU Negative 05/02/2022    WBCUA 0-2 05/02/2022    BACTERIA Rare 05/02/2022    RBCUA 0-2 05/02/2022    BLOODU TRACE-INTACT 05/02/2022    SPECGRAV >=1.030 05/02/2022    GLUCOSEU Negative 05/02/2022     Radiology:     CXR: I have reviewed the CXR with the following interpretation: pending    EKG:  I have reviewed the EKG with the following interpretation: Sinus tachycardia. Nonspecific T wave abnormality. Abnormal ECG. No previous ECGs available. Confirmed by Jonathan Martinez ([id number]) on 5/2/2022 5:36:47 PM    No orders to display     ASSESSMENT:    RASHAD/Penny Farnsworth 1106 Problems    Diagnosis Date Noted    Opioid overdose ([facility] [address] [place]  05/02/2022     Priority: Medium    Tobacco use [Z72.0] 05/02/2022     Priority: Medium    Obesity [E66.9] 05/02/2022     Priority: Medium     PLAN:    Unresponsiveness in setting of fentanyl overdose  -received 3.5 narcan  -UDS negative  -case management consult for d/c assistance - TY  -cessation discussed    Elevated troponin, 0.10 initial  -repeat 0.13  -no c/o chest pain  -likely 2/2 drug use, WANDA, dehydration  -trend troponin  -tele monitoring  -ASA given    WANDA, cr 1.8 on admission, resolved  -IVF given in ED  -repeat cr 1.2    Difficulty ambulating 2/2 right leg pain  -XR right hip revealed: no acute osseous abnormality  -XR lumbar spine revealed: mild multilevel degenerative disc disease and facet joint arthropathy.  No acute lumbar spine abnormality  -concern for rhabdo?   -IVF given in ED  -pt/ot evaluation    Tobacco use  -tobacco cessation   -nicotine patch    Obesity  With Body mass index is 34.4 kg/m². Complicating assessment and treatment. Placing patient at risk for multiple co-morbidities as well as early death and contributing to the patient's presentation. Counseled on weight loss    Acute transaminitis   -Pt with elevated AST/ALT  -likely 2/2 drug abuse  -will trend  -hepatitis panel is pending    Leukocytosis  -likely 2/2 stress response  -cbc in am    DVT Prophylaxis: Lovenox    Diet: No diet orders on file     Code Status: No Order    PT/OT Eval Status: ordered     Dispo - 2-3 days pending clinical improvement     Dari Syed, APRN - CNP    Thank you No primary care provider on file. for the opportunity to be involved in this patient's care.  If you have any questions or concerns please feel free to contact me at [phone].  ------------------------Anticipated cosigner, Dr. Neal Councilman-------------------------------------

## 2022-05-03 NOTE — PLAN OF CARE
Problem: Safety - Adult  Goal: Free from fall injury  Outcome: Progressing   Pt remains free from falls during this shift. Pt a/o and calls out appropriately. Bed in lowest position and wheels locked. Call light within reach. Bedside table within reach. Nonskid footwear in place. Pt refused bed check. Pt has been complaint about only using urinal at bedside and calling to ambulate. Pt educated on the need for safety and calling to move.  Pt wants privacy when using the urinal.

## 2022-05-03 NOTE — PROGRESS NOTES
Hospitalist Progress Note      PCP: No primary care provider on file. Date of Admission: 5/2/2022    Chief Complaint: unresponsiveness     Hospital Course:   HPI :   32 y.o. male, with PMH of opiate abuse, tobacco use and obesity, who was a direct admit from [facility] to [facility] with a fentanyl overdose. History obtained from the patient and review of EMR. The patient stated he snorted an unknown amount of fentanyl today to get high and he \"fell out\". He was found unresponsive and given 3.5mg narcan by EMS. The patient denies any other drug use and/or intent to harm self. Per EMR, the patient was going to be discharged from [facility], but upon receiving his discharge papers he stated he was unable to walk. He stated he felt like his \"legs were asleep\". The patient stated he was told he was \"laying on them funny\" when he was found. Upon further work up the patient was found to have an WANDA with cr of 1.8 and a troponin of 0.10. he was given IVF and his creatinine decreased to baseline of 1.2, but troponin increased to  0.13. the patients CK was checked and resulted at 175. D/t his elevated troponin, the patient was ultimately transferred to [facility] for further evaluation and treatment. He denies any chest pain and/or shortness of breath. troponins will be trended and case management has been consulted for assistance with discharge. The patient denied any other associated symptoms as well as any aggravating and/or alleviating factors. At the time of this assessment, the patient was resting comfortably in bed. He currently denies any chest pain, back pain, abdominal pain, shortness of breath, numbness, tingling, N/V/C/D, fever and/or chills. Subjective:       Mentation at baseline. Pt doing well today though feels unsteady on his feet. Denies any chest pain, SOB, focal weakness.   PT/OT rec IP rehab       Medications:  Reviewed    Infusion Medications    sodium chloride       Scheduled Medications  sodium chloride flush  5-40 mL IntraVENous 2 times per day    enoxaparin  40 mg SubCUTAneous Daily    nicotine  1 patch TransDERmal Daily     PRN Meds: sodium chloride flush, sodium chloride, ondansetron **OR** ondansetron, polyethylene glycol, acetaminophen **OR** acetaminophen      Intake/Output Summary (Last 24 hours) at 5/3/2022 1352  Last data filed at 5/3/2022 1158  Gross per 24 hour   Intake 200 ml   Output 1350 ml   Net -1150 ml       Physical Exam Performed:    /82   Pulse 69   Temp 98.2 °F (36.8 °C) (Oral)   Resp 16   Ht 5' 8\" (1.727 m)   Wt 224 lb 13.9 oz (102 kg)   SpO2 97%   BMI 34.19 kg/m²     General appearance: No apparent distress, appears stated age and cooperative. HEENT: Pupils equal, round, and reactive to light. Conjunctivae/corneas clear. Neck: Supple, with full range of motion. No jugular venous distention. Trachea midline. Respiratory:  Normal respiratory effort. Clear to auscultation, bilaterally without Rales/Wheezes/Rhonchi. Cardiovascular: Regular rate and rhythm with normal S1/S2 without murmurs, rubs or gallops. Abdomen: Soft, non-tender, non-distended with normal bowel sounds. Musculoskeletal: No clubbing, cyanosis or edema bilaterally. Skin: warm and dry   Neurologic:  Neurovascularly intact without any focal sensory/motor deficits.  Cranial nerves: II-XII intact, grossly non-focal.  Psychiatric: Alert and oriented, thought content appropriate, normal insight        Labs:   Recent Labs     05/02/22  1237 05/03/22  0643   WBC 12.1* 8.6   HGB 15.4 14.2   HCT 46.4 42.2    108*     Recent Labs     05/02/22  1237 05/02/22  1700 05/03/22  0643    138 141   K 5.4* 4.7 4.6   CL 96* 100 102   CO2 24 28 31   BUN 15 14 9   CREATININE 1.8* 1.2 0.9   CALCIUM 9.1 8.9 9.2     Recent Labs     05/02/22  1237 05/02/22  1700 05/03/22  0643   * 278* 305*   * 319* 272*   BILIDIR  --   --  <0.2   BILITOT 0.3 0.3 0.3   ALKPHOS 150* 125 109     No results for input(s): INR in the last 72 hours. Recent Labs     05/02/22  1237 05/02/22  1237 05/02/22  1700 05/03/22  0022 05/03/22  0643   CKTOTAL 175  --   --   --   --    TROPONINI 0.10*   < > 0.13* 0.06* 0.05*    < > = values in this interval not displayed. Urinalysis:      Lab Results   Component Value Date    NITRU Negative 05/02/2022    WBCUA 0-2 05/02/2022    BACTERIA Rare 05/02/2022    RBCUA 0-2 05/02/2022    BLOODU TRACE-INTACT 05/02/2022    SPECGRAV >=1.030 05/02/2022    GLUCOSEU Negative 05/02/2022       Radiology:  XR CHEST PORTABLE   Final Result   No acute focal process. Assessment/Plan:    Active Hospital Problems    Diagnosis     Opioid overdose ([facility] [address] [T40.2X1A]      Priority: Medium    Tobacco use [Z72.0]      Priority: Medium    Obesity [E66.9]      Priority: Medium    Accidental fentanyl overdose ([facility]) [T40.411A]      Priority: Medium     Unresponsiveness in setting of fentanyl overdose  -s/p narcan with improvement of mentation  -UDS negative  -case management consult for d/c assistance- pt was supposed to go inpatient drug  rehab prior to presenation  -cessation of illicit drug use advised     Elevated troponin, 0.10 initial  -likely 2/2 drug use, WANDA, dehydration  - Pt denies any chest pain. EKG non acute. Trop downtrended with no further work up anticipated as trop downtrended.        WANDA, cr 1.8 on admission, resolved  -IVF given in ED  - Cr normalized      Difficulty ambulating 2/2 right leg pain  -XR right hip revealed: no acute osseous abnormality  -XR lumbar spine revealed: mild multilevel degenerative disc disease and facet joint arthropathy. No acute lumbar spine abnormality  -  CK  was normal- 175  -pt/ot evaluation rec IP rehab. SW to arrange      Tobacco use  -tobacco cessation   -nicotine patch     Obesity  With Body mass index is 37.2 kg/m². Complicating assessment and treatment.  Placing patient at risk for multiple co-morbidities as well as early death and contributing to the patient's presentation. Counseled on weight loss     Acute transaminitis   -Pt with elevated AST/ALT  -likely 2/2 drug abuse, hep C. Acute hepatitis panel was + for Hep C ab. LFT improving. Check hep C RNA. Outpatient f/u with GI when he completes drug rehab to determine if he is a candidate for treatment of his hep C.        Leukocytosis  -likely 2/2 stress response  - resolved on repeat lab        DVT Prophylaxis:  lovenox   Diet: ADULT DIET;  Regular  Code Status: Full Code    PT/OT Eval Status: rec IP rehab     Dispo - pt medically stable for d/c to IP rehab once arrangement completed by NAKITA.     Melania Austin MD

## 2022-05-03 NOTE — PROGRESS NOTES
Physical Therapy  Facility/Department: [facility] C3 TELE/MED SURG/ONC  Physical Therapy Initial Assessment    Name: Adele Nolasco  : 1990  MRN: [id number]  Date of Service: 5/3/2022    Discharge Recommendations:  IP Rehab   PT Equipment Recommendations  Other: defer to facility      Patient Diagnosis(es): There were no encounter diagnoses. Past Medical History:  has no past medical history on file. Past Surgical History:  has no past surgical history on file. Assessment    Body Structures, Functions, Activity Limitations Requiring Skilled Therapeutic Intervention: Decreased functional mobility ; Decreased ADL status; Decreased strength;Decreased safe awareness;Decreased endurance;Decreased balance; Increased pain;Decreased posture  Assessment: Patient seen PT evaluation, transfers, and gait training. Patient cleared by RN for therapy participation this date. Patient agreeable to therapy. Patient admitted following drug overdose with pt found unresponsive. Pt previously independent and living at transitional home. Patient limited by decreased sensation BLE (LLE>RLE) and L knee buckling as well as pain. Pt initially required max A x1 + min A x1 upon standing without use of AD. Pt progresses to transfers and ambulation with use of RW. Pt requires multiple cues throughout session d/t impulsivity and decreased safety awareness. P.T .will continue to follow throughout LOS. Recommending DC to IP Rehab given new LE deficits and decreased independence with mobility. Treatment Diagnosis: decreased independence with mobility  Specific Instructions for Next Treatment: progress gait and endurance  Therapy Prognosis: Good  Decision Making: Medium Complexity  Requires PT Follow-Up: Yes  Activity Tolerance  Activity Tolerance: Patient tolerated evaluation without incident;Patient tolerated treatment well;Patient limited by fatigue;Patient limited by endurance; Patient limited by pain  Activity Tolerance Comments: 97%, 86 bpm, 157/76     Plan   Plan  Plan: 3-5 times per week  Specific Instructions for Next Treatment: progress gait and endurance  Current Treatment Recommendations: Strengthening,Balance training,Functional mobility training,Transfer training,Endurance training,Gait training,Stair training,Safety education & training,Patient/Caregiver education & training,Therapeutic activities  Safety Devices  Type of Devices: Gait belt,Bed alarm in place,Call light within reach,Nurse notified,Left in bed,Patient at risk for falls,All fall risk precautions in place  Restraints  Restraints Initially in Place: No     Restrictions  Restrictions/Precautions  Restrictions/Precautions: Up as Tolerated  Position Activity Restriction  Other position/activity restrictions: IV     Subjective   Pain: pt reporting pain in L calf (soreness) with mobility, does not formally rate  General  Chart Reviewed: Yes  Patient assessed for rehabilitation services?: Yes  Response To Previous Treatment: Not applicable  Family / Caregiver Present: No  Referring Practitioner: Jeevan Hawley  Referral Date : 05/03/22  Diagnosis: overdose, found down  Follows Commands: Within Functional Limits  Subjective  Subjective: pt in bed, agreeable to therapy         Social/Functional History  Social/Functional History  Type of Home:  (transitional housing/homeless)  ADL Assistance: Independent  Homemaking Assistance: Independent  Ambulation Assistance: Independent  Transfer Assistance: Independent  Active : No  Occupation: Unemployed  Additional Comments: Pt reports that he would most likely go to the [facility] in [place] if he were to leave [facility]. Pt has been in a transitional home for ~9 months. Pt is unsure of the layout of the home.   Vision/Hearing  Vision Exceptions: Wears glasses at all times  Hearing: Within functional limits    Cognition   Cognition  Overall Cognitive Status: Exceptions  Arousal/Alertness: Appropriate responses to stimuli  Following Commands: Follows one step commands with increased time; Follows one step commands with repetition  Attention Span: Appears intact  Memory: Appears intact  Safety Judgement: Decreased awareness of need for assistance;Decreased awareness of need for safety  Problem Solving: Decreased awareness of errors;Assistance required to identify errors made  Insights: Decreased awareness of deficits  Initiation: Does not require cues  Sequencing: Requires cues for some  Cognition Comment: Pt impulsive with decreased safety awareness. Objective   Pulse: 69  Heart Rate Source: Monitor  BP: 135/82  BP Location: Left upper arm  BP Method: Automatic  Patient Position: Semi fowlers  MAP (Calculated): 99.67  Resp: 16  SpO2: 97 %  O2 Device: None (Room air)     Observation/Palpation  Posture: Fair  Gross Assessment  Sensation: Impaired (decreased BLE below knee)     AROM RLE (degrees)  RLE AROM: WFL  AROM LLE (degrees)  LLE AROM : WFL  Strength RLE  Strength RLE: WFL  Strength LLE  Strength LLE: Exception  Comment: 4/5 hip flexion and knee extension        Bed Mobility Training  Bed Mobility Training: Yes  Overall Level of Assistance: Supervision  Supine to Sit: Supervision (HOB elevated)  Sit to Supine: Supervision  Scooting: Supervision    Transfer Training  Transfer Training: Yes  Sit to Stand: Assist X2;Maximum assistance;Minimum assistance (max A + min A without AD initially progressing to min A with RW)  Stand to Sit: Assist X2;Maximum assistance;Minimum assistance (max A + min A without AD progressing to min A x1 with RW)  Gait Training  Gait Training: Yes    Pt progressed from mod A + min A to min A with RW    Pt ambulated 2x 25 ft with slow roma, short step length, and cues for safe use of RW d/t impulsivity.  Pt demos instance of L knee buckling upon standing without use of RW; utilizes BUE for support given LLE deficits    Pt stood at toilet 1 min with CGA                         AM-PAC Score  AM-PAC Inpatient Mobility Raw Score : 14 (05/03/22 1243)  AM-PAC Inpatient T-Scale Score : 38.1 (05/03/22 1243)  Mobility Inpatient CMS 0-100% Score: 61.29 (05/03/22 1243)  Mobility Inpatient CMS G-Code Modifier : CL (05/03/22 1243)          Goals  Short Term Goals  Time Frame for Short term goals: 5/10/22  Short term goal 1: Pt will complete bed mobility with independence. Short term goal 2: Pt will complete transfers with LRAD mod I. Short term goal 3: Pt will ambulate 100 ft with LRAD and SBA. Short term goal 4: Pt will tolerate 12-15 reps of LE exercises to progress strength and balance by 5/6. Patient Goals   Patient goals : \"Walk better\"       Therapy Time   Individual Concurrent Group Co-treatment   Time In 0929         Time Out 0950         Minutes 21         Timed Code Treatment Minutes: 11 Minutes (10 min eval)     If pt is unable to be seen after this session, please let this note serve as discharge summary. Please see case management note for discharge disposition. Thank you.     Lorene Acevedo, PT

## 2022-05-04 ENCOUNTER — HOSPITAL ENCOUNTER (INPATIENT)
Age: 32
LOS: 2 days | Discharge: NURSING, REHAB OR LONG-TERM CARE | DRG: 861 | End: 2022-05-06
Attending: STUDENT IN AN ORGANIZED HEALTH CARE EDUCATION/TRAINING PROGRAM | Admitting: STUDENT IN AN ORGANIZED HEALTH CARE EDUCATION/TRAINING PROGRAM
Payer: COMMERCIAL

## 2022-05-04 ENCOUNTER — APPOINTMENT (OUTPATIENT)
Dept: ULTRASOUND IMAGING | Age: 32
DRG: 861 | End: 2022-05-04
Attending: INTERNAL MEDICINE
Payer: COMMERCIAL

## 2022-05-04 VITALS
HEART RATE: 60 BPM | HEIGHT: 68 IN | DIASTOLIC BLOOD PRESSURE: 77 MMHG | SYSTOLIC BLOOD PRESSURE: 145 MMHG | BODY MASS INDEX: 34.08 KG/M2 | RESPIRATION RATE: 15 BRPM | OXYGEN SATURATION: 96 % | TEMPERATURE: 98.4 F | WEIGHT: 224.87 LBS

## 2022-05-04 PROBLEM — T40.411A ACCIDENTAL FENTANYL OVERDOSE (HCC): Status: RESOLVED | Noted: 2022-05-02 | Resolved: 2022-05-04

## 2022-05-04 PROBLEM — R77.8 ELEVATED TROPONIN: Status: ACTIVE | Noted: 2022-05-04

## 2022-05-04 PROBLEM — R79.89 ABNORMAL LFTS: Status: ACTIVE | Noted: 2022-05-04

## 2022-05-04 PROBLEM — B19.20 HEPATITIS C: Status: ACTIVE | Noted: 2022-05-04

## 2022-05-04 PROBLEM — T50.901A DRUG OVERDOSE, ACCIDENTAL OR UNINTENTIONAL, INITIAL ENCOUNTER: Status: ACTIVE | Noted: 2022-05-04

## 2022-05-04 PROBLEM — T40.2X1A OPIOID OVERDOSE (HCC): Status: RESOLVED | Noted: 2022-05-02 | Resolved: 2022-05-04

## 2022-05-04 PROBLEM — R53.81 DEBILITY: Status: ACTIVE | Noted: 2022-05-04

## 2022-05-04 LAB
ALBUMIN SERPL-MCNC: 3.7 G/DL (ref 3.4–5)
ALP BLD-CCNC: 114 U/L (ref 40–129)
ALT SERPL-CCNC: 300 U/L (ref 10–40)
AST SERPL-CCNC: 304 U/L (ref 15–37)
BILIRUB SERPL-MCNC: 0.5 MG/DL (ref 0–1)
BILIRUBIN DIRECT: <0.2 MG/DL (ref 0–0.3)
BILIRUBIN, INDIRECT: ABNORMAL MG/DL (ref 0–1)
LV EF: 55 %
LVEF MODALITY: NORMAL
TOTAL PROTEIN: 7.1 G/DL (ref 6.4–8.2)

## 2022-05-04 PROCEDURE — G0378 HOSPITAL OBSERVATION PER HR: HCPCS

## 2022-05-04 PROCEDURE — 97535 SELF CARE MNGMENT TRAINING: CPT

## 2022-05-04 PROCEDURE — 80076 HEPATIC FUNCTION PANEL: CPT

## 2022-05-04 PROCEDURE — 97110 THERAPEUTIC EXERCISES: CPT

## 2022-05-04 PROCEDURE — 93306 TTE W/DOPPLER COMPLETE: CPT

## 2022-05-04 PROCEDURE — 36415 COLL VENOUS BLD VENIPUNCTURE: CPT

## 2022-05-04 PROCEDURE — 2580000003 HC RX 258: Performed by: NURSE PRACTITIONER

## 2022-05-04 PROCEDURE — 6360000002 HC RX W HCPCS: Performed by: NURSE PRACTITIONER

## 2022-05-04 PROCEDURE — 96372 THER/PROPH/DIAG INJ SC/IM: CPT

## 2022-05-04 PROCEDURE — 76705 ECHO EXAM OF ABDOMEN: CPT

## 2022-05-04 PROCEDURE — 1280000000 HC REHAB R&B

## 2022-05-04 RX ORDER — NICOTINE 21 MG/24HR
1 PATCH, TRANSDERMAL 24 HOURS TRANSDERMAL DAILY
Status: CANCELLED | OUTPATIENT
Start: 2022-05-04

## 2022-05-04 RX ORDER — ONDANSETRON 4 MG/1
4 TABLET, ORALLY DISINTEGRATING ORAL EVERY 8 HOURS PRN
Status: CANCELLED | OUTPATIENT
Start: 2022-05-04

## 2022-05-04 RX ORDER — BISACODYL 10 MG
10 SUPPOSITORY, RECTAL RECTAL DAILY PRN
Status: DISCONTINUED | OUTPATIENT
Start: 2022-05-04 | End: 2022-05-06 | Stop reason: HOSPADM

## 2022-05-04 RX ORDER — POLYETHYLENE GLYCOL 3350 17 G/17G
17 POWDER, FOR SOLUTION ORAL DAILY PRN
Status: DISCONTINUED | OUTPATIENT
Start: 2022-05-04 | End: 2022-05-06 | Stop reason: HOSPADM

## 2022-05-04 RX ORDER — ACETAMINOPHEN 325 MG/1
650 TABLET ORAL EVERY 6 HOURS PRN
Status: CANCELLED | OUTPATIENT
Start: 2022-05-04

## 2022-05-04 RX ORDER — ONDANSETRON 2 MG/ML
4 INJECTION INTRAMUSCULAR; INTRAVENOUS EVERY 6 HOURS PRN
Status: DISCONTINUED | OUTPATIENT
Start: 2022-05-04 | End: 2022-05-06 | Stop reason: HOSPADM

## 2022-05-04 RX ORDER — ACETAMINOPHEN 325 MG/1
650 TABLET ORAL EVERY 4 HOURS PRN
Status: DISCONTINUED | OUTPATIENT
Start: 2022-05-04 | End: 2022-05-05

## 2022-05-04 RX ORDER — POLYETHYLENE GLYCOL 3350 17 G/17G
17 POWDER, FOR SOLUTION ORAL DAILY PRN
Status: CANCELLED | OUTPATIENT
Start: 2022-05-04

## 2022-05-04 RX ORDER — ACETAMINOPHEN 650 MG/1
650 SUPPOSITORY RECTAL EVERY 6 HOURS PRN
Status: DISCONTINUED | OUTPATIENT
Start: 2022-05-04 | End: 2022-05-06 | Stop reason: HOSPADM

## 2022-05-04 RX ORDER — NICOTINE 21 MG/24HR
1 PATCH, TRANSDERMAL 24 HOURS TRANSDERMAL DAILY
Status: DISCONTINUED | OUTPATIENT
Start: 2022-05-04 | End: 2022-05-06 | Stop reason: HOSPADM

## 2022-05-04 RX ORDER — BISACODYL 10 MG
10 SUPPOSITORY, RECTAL RECTAL DAILY PRN
Status: CANCELLED | OUTPATIENT
Start: 2022-05-04

## 2022-05-04 RX ORDER — ACETAMINOPHEN 325 MG/1
650 TABLET ORAL EVERY 6 HOURS PRN
Status: DISCONTINUED | OUTPATIENT
Start: 2022-05-04 | End: 2022-05-06 | Stop reason: HOSPADM

## 2022-05-04 RX ORDER — ACETAMINOPHEN 650 MG/1
650 SUPPOSITORY RECTAL EVERY 6 HOURS PRN
Status: CANCELLED | OUTPATIENT
Start: 2022-05-04

## 2022-05-04 RX ORDER — ACETAMINOPHEN 325 MG/1
650 TABLET ORAL EVERY 4 HOURS PRN
Status: CANCELLED | OUTPATIENT
Start: 2022-05-04

## 2022-05-04 RX ORDER — ENOXAPARIN SODIUM 100 MG/ML
40 INJECTION SUBCUTANEOUS DAILY
Status: DISCONTINUED | OUTPATIENT
Start: 2022-05-05 | End: 2022-05-06 | Stop reason: HOSPADM

## 2022-05-04 RX ORDER — ENOXAPARIN SODIUM 100 MG/ML
40 INJECTION SUBCUTANEOUS DAILY
Status: CANCELLED | OUTPATIENT
Start: 2022-05-04

## 2022-05-04 RX ORDER — ONDANSETRON 4 MG/1
4 TABLET, ORALLY DISINTEGRATING ORAL EVERY 8 HOURS PRN
Status: DISCONTINUED | OUTPATIENT
Start: 2022-05-04 | End: 2022-05-06 | Stop reason: HOSPADM

## 2022-05-04 RX ORDER — ONDANSETRON 2 MG/ML
4 INJECTION INTRAMUSCULAR; INTRAVENOUS EVERY 6 HOURS PRN
Status: CANCELLED | OUTPATIENT
Start: 2022-05-04

## 2022-05-04 RX ADMIN — ENOXAPARIN SODIUM 40 MG: 100 INJECTION SUBCUTANEOUS at 09:33

## 2022-05-04 RX ADMIN — SODIUM CHLORIDE, PRESERVATIVE FREE 10 ML: 5 INJECTION INTRAVENOUS at 09:47

## 2022-05-04 ASSESSMENT — PAIN SCALES - GENERAL: PAINLEVEL_OUTOF10: 0

## 2022-05-04 NOTE — CONSULTS
Patient: Kira Miller  [id number]  Date: 5/4/2022      Chief Complaint: overdose    History of Present Illness/Hospital Course:  Kira Miller is a 32year old male with a past medical history significant for polysubstance abuse who presented to [place] on 5/2/22 via EMS after fentanyl overdose. Per report that patient was found unresponsive and given 3.5 doses of Narcan by EMS. He was going to be discharged to the [facility], but was unable to walk. Lab work up was remarkable for WANDA and elevated troponin. He was transferred to [facility] for further management. He was managed with IVF. Elevated troponin was attributed to non-ischemic myocardial injury. He was evaluated by therapies and determined to be a good candidate for inpatient rehabilitation. Today he is seen in his room. He reports continued weakness in his legs. He denies significant pain or other acute complaints. has no past medical history on file. has no past surgical history on file. reports that he has been smoking. He has never used smokeless tobacco. He reports previous alcohol use. He reports current drug use.    family history is not on file. REVIEW OF SYSTEMS:   CONSTITUTIONAL: negative for fevers, chills, diaphoresis, activity change, appetite change, fatigue, night sweats and unexpected weight change.    EYES: negative for blurred vision, eye discharge, visual disturbance and icterus  HEENT: negative for hearing loss, tinnitus, ear drainage, sinus pressure, nasal congestion, epistaxis and snoring  RESPIRATORY: Negative for hemoptysis, cough, sputum production  CARDIOVASCULAR: negative for chest pain, palpitations, exertional chest pressure/discomfort, edema, syncope  GASTROINTESTINAL: negative for nausea, vomiting, diarrhea, constipation, blood in stool and abdominal pain  GENITOURINARY: negative for frequency, dysuria, urinary incontinence, decreased urine volume, and hematuria  HEMATOLOGIC/LYMPHATIC: negative for easy bruising, bleeding and lymphadenopathy  ALLERGIC/IMMUNOLOGIC: negative for recurrent infections, angioedema, anaphylaxis and drug reactions  ENDOCRINE: negative for weight changes and diabetic symptoms including polyuria, polydipsia and polyphagia  MUSCULOSKELETAL: positive for leg weakness; negative for pain, joint swelling, decreased range of motion   NEUROLOGICAL: negative for headaches, slurred speech, unilateral weakness  PSYCHIATRIC/BEHAVIORAL: negative for hallucinations, behavioral problems, confusion and agitation. Physical Examination:  Vitals: Patient Vitals for the past 24 hrs:   BP Temp Temp src Pulse Resp SpO2   05/04/22 1123 (!) 153/92 -- -- 66 -- 96 %   05/04/22 1059 124/75 98.4 °F (36.9 °C) Oral 60 15 98 %   05/04/22 0856 127/86 98.7 °F (37.1 °C) Oral 63 15 97 %   05/04/22 0107 (!) 146/90 98.5 °F (36.9 °C) Oral 57 15 97 %   05/03/22 2115 (!) 141/83 98.4 °F (36.9 °C) Oral 77 18 99 %   05/03/22 1452 (!) 149/93 97.8 °F (36.6 °C) Oral 72 16 98 %     Mood: Stable  Const: No distress  ENT: Oral mucosa moist  Eyes: No discharge or injection  CV: extremities well perfused  Resp: No respiratory distress, on room air  GI: Soft, nontender, nondistended. Neuro: Alert, oriented, appropriate. No cranial nerve deficits appreciated. Sensation intact to light touch. Motor examination reveals normal strength in all four limbs diffusely. Skin: No lesions or rashes noted. MSK: No joint abnormalities noted.        Lab Results   Component Value Date    WBC 8.6 05/03/2022    HGB 14.2 05/03/2022    HCT 42.2 05/03/2022    MCV 94.3 05/03/2022     (L) 05/03/2022     No results found for: INR, PROTIME  Lab Results   Component Value Date    CREATININE 0.9 05/03/2022    BUN 9 05/03/2022     05/03/2022    K 4.6 05/03/2022     05/03/2022    CO2 31 05/03/2022     Lab Results   Component Value Date     (H) 05/04/2022     (H) 05/04/2022    ALKPHOS 114 05/04/2022    BILITOT 0.5 05/04/2022       Most recent echocardiogram 5/4/22   Normal left ventricle systolic function with an estimated ejection fraction   of 55%. No regional wall motion abnormalities are seen. Normal left ventricular diastolic filling pressures. The right ventricle is normal in size and function. Trace mitral and tricuspid valve regurgitation. Inadequate tricuspid regurgitation to estimate systolic pulmonary artery   pressure. IVC is normal in size (< 2.1 cm) and collapses > 50% with respiration   consistent with normal right atrial pressure (3 mmHg). Most recent EKG 5/2/22  Sinus tachycardiaNonspecific T wave abnormalityAbnormal ECGNo previous ECGs available    IMAGING    US Gallbladder 5/4/22  LIVER:  The liver demonstrates normal echogenicity without evidence of   intrahepatic biliary ductal dilatation.       BILIARY SYSTEM:  Gallbladder is unremarkable without evidence of   pericholecystic fluid, wall thickening or stones.  Negative sonographic   Londono's sign.       Common bile duct is within normal limits measuring 3 mm.       RIGHT KIDNEY: The right kidney is grossly unremarkable without evidence of   hydronephrosis.       PANCREAS:  Visualized portions of the pancreas are unremarkable.       OTHER: No evidence of right upper quadrant ascites. Assessment:  1. Debility with lower extremity weakness  2. Fentanyl overdose  3. WANDA  4. Elevated troponin  5. Obesity    Recommendations:  Patient with new functional deficits and ongoing medical complexity. Demonstrates ability to tolerate 3 hours therapy/day. Dilia Agrawal is a good candidate for acute inpatient rehab when medically appropriate. Thank you for this consult. Please contact me with any questions or concerns. Iris Irish Rosario Apley, MD 5/4/2022, 1:40 PM

## 2022-05-04 NOTE — PLAN OF CARE
ARU PATIENT TREATMENT PLAN  [address] [place] [address] [address]   [phone]    Steffany Barcenas    : 1990  Acct #: [de-identified]  MRN: [id number]   PHYSICIAN:  Angela Zuniga MD  Primary Problem    Patient Active Problem List   Diagnosis    Tobacco use    Obesity    [facility]ANDA [facility]) [facility] [address]    Elevated troponin    Debility    Drug overdose, accidental or unintentional, initial encounter    Hepatitis C    Abnormal LFTs       Rehabilitation Diagnosis:     Debility [R53.81]       ADMIT DATE:2022    Patient Goals: \"to get my treatment\"       Admitting Impairments: Pt. Admitted s/p d/t drug overdose resulting in LE weakness/numbness resulting in decreased strength.   Barriers: comorbidities  Participation: Good     CARE PLAN     NURSING:  Steffany Barcenas while on this unit will:     [x] Be continent of bowel and bladder     [x] Have an adequate number of bowel movements  [] Urinate with no urinary retention >300ml in bladder  [] Complete bladder protocol with logan removal  [x] Maintain O2 SATs at _90__%  [x] Have pain managed while on ARU       [] Be pain free by discharge   [x] Have no skin breakdown while on ARU  [] Have improved skin integrity via wound measurements  [] Have no signs/symptoms of infection at the wound site  [x] Be free from injury during hospitalization   [] Complete education with patient/family with understanding demonstrated for:  [] Adjustment   [] Other:   Nursing interventions may include bowel/bladder training, education for medical assistive devices, medication education, O2 saturation management, energy conservation, stress management techniques, fall prevention, alarms protocol, seating and positioning, skin/wound care, pressure relief instruction,dressing changes,  infection protection, DVT prophylaxis, and/or assistance with in room safety with transfers to bed, toilet, wheelchair, shower as well as bathroom activities and hygiene. Patient/caregiver education for:   [] Disease/sustained injury/management      [x] Medication Use   [] Surgical intervention   [x] Safety   [x] Body mechanics and or joint protection   [] Health maintenance         PHYSICAL THERAPY:  Goals:                               Long Term Goals  Time Frame for Long term goals : 5/5/2022 1 day. Long term goal 1: Patient demonstrates indep with gait 1000' feet with no AD and FGI 30/30 with indep in stairs. 5/5/2022 Goal met, Patient demonstrates indep with gait 1000' feet with no AD and FGI 30/30 with indep in stairs. These goals were reviewed with this patient at the time of assessment and Danish Reyes is in agreement. Plan of Care: Pt to be seen 5 out of 7 days per week, 90  mins (exact) per day for 1 days (exact)                   Current Treatment Recommendations: Gait training,Stair training      OCCUPATIONAL THERAPY:  Goals:             Short Term Goals  Time Frame for Short term goals: 1x eval/treat  Short Term Goal 1: Pt will demo safe performance of ADL with I-- GOAL MET 5/05 :    :    These goals were reviewed with this patient at the time of assessment and Danish Reyes is in agreement    Plan of Care:  Pt to be seen 5 out of 7 days per week, 90   mins (exact) per day for 2 days (exact)           CASE MANAGEMENT:  Goals:   Assist patient/family with discharge planning, patient/family counseling,   and coordination with insurance during ARU stay.     QIM / IRF EMILY SCORES:  ITEM CURRENT SCORE GOAL   Eating       Oral Hygiene       Toileting Hygiene       Shower/Bathe Self       Upper Body Dressing       Lower Body Dressing       On/Off Footwear       Roll Left & Right CARE Score: 6 Discharge Goal: Independent   Sit to Lying  CARE Score: 6 Discharge Goal: Independent   Lying to Sitting EOB CARE Score: 6 Discharge Goal: Independent   Sit to Stand CARE Score: 6 Discharge Goal: Independent   Chair/Bed to Chair Transfer CARE Score: 6 Discharge Goal: Independent   Toilet Transfer       Car Transfer CARE Score: 6 Discharge Goal: Independent   Walk 10 Feet CARE Score: 6 Discharge Goal: Independent   Walk 50 Feet, 2 Turns CARE Score: 6 Discharge Goal: Independent   Walk 150 Feet CARE Score: 6 Discharge Goal: Independent   Walk 10 Feet, Uneven Surface CARE Score: 6 Discharge Goal: Independent   1 Step (Curb) CARE Score: 6 Discharge Goal: Independent   4 Steps CARE Score: 6 Discharge Goal: Independent   12 Steps CARE Score: 6 Discharge Goal: Independent    Object CARE Score: 6 Discharge Goal: Independent (pt independent in floor to stand transfers)   Wheel 50 feet, 2 turns       Wheel 150 Feet              Elfego Anna will be seen a minimum of 3 hours of therapy per day, a minimum of 5 out of 7 days per week. [] In this rare instance due to the nature of this patient's medical involvement, this patient will be seen 15 hours per week (900 minutes within a 7 day period). Treatments may include therapeutic exercises, gait training, neuromuscular re-ed, transfer training, community reintegration, bed mobility, w/c mobility and training, self care, home mgmt, cognitive training, energy conservation,dysphagia tx, speech/language/communication therapy, group therapy, and patient/family education. In addition, dietician/nutritionist may monitor calorie count as well as intake and collaboratively work with SLP on dietary upgrades. Neuropsychology/Psychology may evaluate and provide necessary support.     Medical issues being managed closely and that require 24 hour availability of a physician:   [] Swallowing Precautions  [x] Bowel/Bladder Fx  [] Weight bearing precautions   [] Wound Care    [] Pain Mgmt   [x] Infection Protection   [x] DVT Prophylaxis   [x] Fall Precautions  [x] Fluid/Electrolyte/Nutrition Balance   [] Voice Protection   [] Respiratory  [] Other:    Medical Prognosis: [x] Good  [] Fair    [] Guarded   Total expected IRF days 2  Anticipated discharge destination:    [] Home Independently   [x] Home Modified Independent  [] Home with supervision    []SNF     [] Other                                           Physician anticipated functional outcomes:  Home w/ assist as needed  IPOC brief synthesis: Mike Omalley is a 32year old male with a past medical history significant for Hepatitis C, polysubstance abuse, and obesity who presented to [facility] on 5/2/22 via EMS after fentanyl overdose. Per report that patient was found unresponsive and given 3.5 doses of Narcan by EMS. He was going to be discharged to the [facility], but was unable to walk. Lab work up was remarkable for WANDA and elevated troponin. He was transferred to [facility] for further management. He was managed with IVF. Elevated troponin was attributed to non-ischemic myocardial injury. He was admitted to [facility] on 5/4/22 due to functional deficits below his baseline. This plan has been reviewed with Mike Omalley on 5/5  in a language the patient understands. Mike Omalley has had the opportunity to include input with the therapy team.      I have reviewed this initial plan of care and agree with its contents:    Title   Name    Date    Time    Physician: Suleiman Cordero.  Reina Sánchez MD    Case Mgmt: Alexis Davis RN    OT: Emily Le OTR/L    PT:Kimberley Gray 1413      RN: Candelaria Santos RN    ST:    : Harpreet Vásquez OTR/L    Other:

## 2022-05-04 NOTE — CARE COORDINATION
[facility] - Acute Rehab Unit   Pre-cert has been approved for IPR admission. Noted pt has an ECHO pending which would need to be completed and resulted prior to ARU admission. VM left for CM. Please call to set up admission time (pending ECHO completion). Rapid covid needed prior to admit as well.      Ryan Davis 92 #[id number]  Speech-Language Pathologist  [address] [place] [address] Unit  Desk: [phone]

## 2022-05-04 NOTE — DISCHARGE INSTR - COC
Continuity of Care Form    Patient Name: Hitesh Murphy   :  1990  MRN:  [id number]    Admit date:  2022  Discharge date:  2022    Code Status Order: Full Code   Advance Directives:      Admitting Physician:  Soila Espinosa MD  PCP: No primary care provider on file. Discharging Nurse: [facility] Unit/Room#: 1000/9136-16  Discharging Unit Phone Number: [phone]    Emergency Contact:   Extended Emergency Contact Information  Primary Emergency Contact: Tiffanie Newsome  Home Phone: [phone]  Relation: Parent    Past Surgical History:  No past surgical history on file. Immunization History: There is no immunization history on file for this patient. Active Problems:  Patient Active Problem List   Diagnosis Code    Opioid overdose ([facility] [address] T40.2X1A    Tobacco use Z72.0    Obesity E66.9    Accidental fentanyl overdose ([facility] [address] T40.411A    WANDA (acute kidney injury) ([facility] [address] N17.9       Isolation/Infection:   Isolation            No Isolation          Patient Infection Status       None to display            Nurse Assessment:  Last Vital Signs: /86   Pulse 63   Temp 98.7 °F (37.1 °C) (Oral)   Resp 15   Ht 5' 8\" (1.727 m)   Wt 224 lb 13.9 oz (102 kg)   SpO2 97%   BMI 34.19 kg/m²     Last documented pain score (0-10 scale): Pain Level: 0  Last Weight:   Wt Readings from Last 1 Encounters:   22 224 lb 13.9 oz (102 kg)     Mental Status:  oriented, alert, coherent, logical, thought processes intact, and able to concentrate and follow conversation    IV Access:  - None    Nursing Mobility/ADLs:  Walking   Assisted  Transfer  Assisted  Bathing  Assisted  Dressing  Assisted  Toileting  Assisted  Feeding  [address] Delivery   none    Wound Care Documentation and Therapy:        Elimination:  Continence:    Bowel: Yes  Bladder: Yes  Urinary Catheter: None   Colostomy/Ileostomy/Ileal Conduit: No       Date of Last BM:     Intake/Output Summary (Last 24 hours) at 5/4/2022 1101  [facility] [address]  Gross per 24 hour   Intake 240 ml   Output 1500 ml   Net -1260 ml     I/O last 3 completed shifts: In: 440 [P.O.:440]  Out: 2150 [Urine:2150]    Safety Concerns: At Risk for Falls    Impairments/Disabilities:      None    Nutrition Therapy:  Current Nutrition Therapy:   - Oral Diet:  General    Routes of Feeding: Oral  Liquids: Thin Liquids  Daily Fluid Restriction: no  Last Modified Barium Swallow with Video (Video Swallowing Test): not done      Rehab Therapies: Physical Therapy and Occupational Therapy  Weight Bearing Status/Restrictions: No weight bearing restrictions  Other Medical Equipment (for information only, NOT a DME order):  ***  Other Treatments:     Patient's personal belongings (please select all that are sent with patient):  None    RN SIGNATURE:  Electronically signed by Pranav Spicer RN on 5/4/22 at 2:11 PM EDT    CASE MANAGEMENT/SOCIAL WORK SECTION    Inpatient Status Date: 5/4/22    Readmission Risk Assessment Score:  Readmission Risk              Risk of Unplanned Readmission:  7           Discharging to Facility/ Agency   [facility]  [id number]  / signature: Electronically signed by Anthony Espinoza RN on 5/4/22 at 11:02 AM EDT    PHYSICIAN SECTION    Prognosis: Good    Condition at Discharge: Stable    Rehab Potential (if transferring to Rehab): Good    Recommended Labs or Other Treatments After Discharge:   - Patient to go to acute drug rehab after discharge from ARU   - outpatient follow up with GI for possible treatment of hep C after completes acute drug rehab    Physician Certification: I certify the above information and transfer of Roxene Babinski  is necessary for the continuing treatment of the diagnosis listed and that he requires Acute Rehab for less 30 days.      Update Admission H&P: No change in H&P    PHYSICIAN SIGNATURE:  Electronically signed by Shaneka Velásquez MD on 5/4/22 at 2:18 PM EDT

## 2022-05-04 NOTE — CARE COORDINATION
Cert back for ARU. Will need ECHO resulted prior to d/c. Rapid covid done on 5/2, likely not to need updated. MD messaged for d/c order. Nasim Maloney RN          CASE MANAGEMENT DISCHARGE SUMMARY      Discharge to: ARU    New Durable Medical Equipment ordered/agency: none    Transportation:    In house  Confirmed discharge plan with:     Patient: yes         Facility/Agency, name:  [facility] faxed [phone]   Phone number for report to facility: [phone]     RN, name: Jake Kline    Note: Discharging nurse to complete [facility], reconcile [facility], and place final copy with patient's discharge packet. RN to ensure that written prescriptions for  Level II medications are sent with patient to the facility as per protocol.     Nasim Maloney, RN

## 2022-05-04 NOTE — PLAN OF CARE
Problem: Pain  Goal: Verbalizes/displays adequate comfort level or baseline comfort level  Outcome: Adequate for Discharge     Problem: Safety - Adult  Goal: Free from fall injury  5/4/2022 1406 by Lebron Barcenas RN  Outcome: Adequate for Discharge  5/4/2022 0857 by Lebron Barcenas RN  Outcome: Progressing  Flowsheets (Taken 5/4/2022 0654 by Callie Sellers RN)  Free From Fall Injury: Based on caregiver fall risk screen, instruct family/caregiver to ask for assistance with transferring infant if caregiver noted to have fall risk factors

## 2022-05-04 NOTE — DISCHARGE INSTR - DIET

## 2022-05-04 NOTE — PROGRESS NOTES
Assessment completed and documented. VSS. A/ox4. Calm, cooperative, pleasant. Tolerating diet. Refusing nicotine patch. Tele monitor on, batteries replaced x2 during shift. Ultrasound completed. x1- standby assist. Was able to get into wheelchair without assistance. Denies pain. Bed locked and in lowest position. Bedside table and call light within reach. Denies further needs at this time. Still needs ECHO. ECHO complete @1050.

## 2022-05-04 NOTE — PROGRESS NOTES
NURSING ASSESSMENT: [facility] Dx/Hx: Caro Webb [R53.81]   P:2[birth year]  KBU:[id number]  Date of Admit: 5/4/2022  Room #: 3455/3029-11    Subjective:   Patient admitted to room 169 @1600 from 324 via wheelchair. Alert and oriented x4. Oriented to room and call light system. Oriented to rehab routine and therapy schedules. Informed about care conferences and ordering of meals with PCA. Drug / Medication Review:   Medications were reviewed by RN at time of admission  [x]  No potential or actual clinically significant medication issues were noted. []  Potential or actual clinically significant medication issues were found and MD was notified. 4 Eyes Skin Assessment   The patient is being assessed for: Admission     I agree that 2 RN's have performed a thorough Head to Toe Skin Assessment on the patient. ALL assessment sites listed below have been assessed. Areas assessed by both nurses:   [x]   Head, Face, and Ears   [x]   Shoulders, Back, and Chest, Abdomen  [x]   Arms, Elbows, and Hands   [x]   Coccyx, Sacrum, and Ischium  [x]   Legs, Feet, and Heel     Does the Patient have Skin Breakdown?   No         Juan Ramon Prevention initiated:  Yes  Wound Care Orders initiated:  Not Applicable      [facility] nurse consulted for Pressure Injury (Stage 3,4, Unstageable, DTI, NWPT, Complex wounds)and New or Established Ostomies:  Not Applicable    Primary Nurse eSignature: Electronically signed by Audelia Valdez RN on 5/4/2022 at 4:35 PM    Co-signer eSignature:  Electronically signed by Fide Moncada RN on 5/5/2022 at 1:31 AM

## 2022-05-04 NOTE — DISCHARGE SUMMARY
Hospital Medicine Discharge Summary    Patient ID: Glen Obregon      Patient's PCP: No primary care provider on file. Admit Date: 5/2/2022     Discharge Date: 5/4/2022      Admitting Provider: Aubree Shields MD     Discharge Provider: Aubree Shields MD     Discharge Diagnoses: Active Hospital Problems    Diagnosis     Drug overdose, accidental or unintentional, initial encounter [T50.771A]      Priority: High    Elevated troponin [R77.8]      Priority: Medium    Hepatitis C [B19.20]      Priority: Medium    Abnormal LFTs [R94.5]      Priority: Medium    WANDA (acute kidney injury) ([facility] [address] [N17.9]      Priority: Medium    Tobacco use [Z72.0]      Priority: Medium    Obesity [E66.9]      Priority: Medium       The patient was seen and examined on day of discharge and this discharge summary is in conjunction with any daily progress note from day of discharge. HPI :   32 y.o. male, with PMH of opiate abuse, tobacco use and obesity, who was a direct admit from [facility] to [facility] with a fentanyl overdose. History obtained from the patient and review of EMR. The patient stated he snorted an unknown amount of fentanyl today to get high and he \"fell out\". He was found unresponsive and given 3.5mg narcan by EMS. The patient denies any other drug use and/or intent to harm self. Per EMR, the patient was going to be discharged from [facility], but upon receiving his discharge papers he stated he was unable to walk. He stated he felt like his \"legs were asleep\". The patient stated he was told he was \"laying on them funny\" when he was found. Upon further work up the patient was found to have an WANDA with cr of 1.8 and a troponin of 0.10. he was given IVF and his creatinine decreased to baseline of 1.2, but troponin increased to  0.13. the patients CK was checked and resulted at 175.  D/t his elevated troponin, the patient was ultimately transferred to [facility] for further evaluation and treatment. He denies any chest pain and/or shortness of breath. troponins will be trended and case management has been consulted for assistance with discharge. The patient denied any other associated symptoms as well as any aggravating and/or alleviating factors. At the time of this assessment, the patient was resting comfortably in bed. He currently denies any chest pain, back pain, abdominal pain, shortness of breath, numbness, tingling, N/V/C/D, fever and/or chills. Hospital Course:      Unresponsiveness in setting of fentanyl overdose  -s/p narcan with improvement of mentation. Resolved  -UDS negative  -Pt was supposed to go inpatient drug  rehab at [facility]  prior to presentation which he would likely go after he completes acute  IP rehab as recommended by PT/OT at [facility]. - cessation of illicit drug use advised     Elevated troponin, 0.10 initial presentation  -likely 2/2 drug use, WANDA, dehydration  - Pt denied any chest pain. EKG was non acute for ischemia. Cardiac echo obtained showed normal EF with no wall motion abormalities. Trop downtrended with ACS thought unlikely.          WANDA, cr 1.8 on admission, resolved  -IVF given in ED  - Cr normalized      Difficulty ambulating 2/2 right leg pain  -XR right hip revealed: no acute osseous abnormality  -XR lumbar spine revealed: mild multilevel degenerative disc disease and facet joint arthropathy. No acute lumbar spine abnormality  -  CK  was normal- 175  -PT/OT  evaluation rec IP rehab which SW arranged at [facility].           Obesity  With Body mass index is 34.4 kg/m². Complicating assessment and treatment. Placing patient at risk for multiple co-morbidities as well as early death and contributing to the patient's presentation. Counseled on weight loss     Acute transaminitis   -Pt with elevated AST/ALT  -likely 2/2 drug abuse, hep C. Acute hepatitis panel was + for Hep C ab. Pt with known hep C. Hep C RNA pending. RUQ US with unremarkable.  Consider outpatient f/u with GI when he completes drug rehab to determine if he is a candidate for treatment of his hep C.         Leukocytosis  -likely 2/2 stress response  - resolved on repeat lab       Physical Exam Performed:     BP (!) 145/77   Pulse 60   Temp 98.4 °F (36.9 °C) (Oral)   Resp 15   Ht 5' 8\" (1.727 m)   Wt 224 lb 13.9 oz (102 kg)   SpO2 96%   BMI 34.19 kg/m²        /82   Pulse 69   Temp 98.2 °F (36.8 °C) (Oral)   Resp 16   Ht 5' 8\" (1.727 m)   Wt 224 lb 13.9 oz (102 kg)   SpO2 97%   BMI 34.19 kg/m²      General appearance: No apparent distress, appears stated age and cooperative. HEENT: Pupils equal, round, and reactive to light. Conjunctivae/corneas clear. Neck: Supple, with full range of motion. No jugular venous distention. Trachea midline. Respiratory:  Normal respiratory effort. Clear to auscultation, bilaterally without Rales/Wheezes/Rhonchi. Cardiovascular: Regular rate and rhythm with normal S1/S2 without murmurs, rubs or gallops. Abdomen: Soft, non-tender, non-distended with normal bowel sounds. Musculoskeletal: No clubbing, cyanosis or edema bilaterally. Skin: warm and dry   Neurologic:  Neurovascularly intact without any focal sensory/motor deficits. Cranial nerves: II-XII intact, grossly non-focal.  Psychiatric: Alert and oriented, thought content appropriate, normal insight         Labs: For convenience and continuity at follow-up the following most recent labs are provided:      CBC:    Lab Results   Component Value Date    WBC 8.6 05/03/2022    HGB 14.2 05/03/2022    HCT 42.2 05/03/2022     05/03/2022       Renal:    Lab Results   Component Value Date     05/03/2022    K 4.6 05/03/2022     05/03/2022    CO2 31 05/03/2022    BUN 9 05/03/2022    CREATININE 0.9 05/03/2022    CALCIUM 9.2 05/03/2022         Significant Diagnostic Studies    Radiology:   US GALLBLADDER RUQ   Final Result   Unremarkable right upper quadrant ultrasound.       RECOMMENDATIONS: Unavailable         XR CHEST PORTABLE   Final Result   No acute focal process. Cardiac echo :   Normal left ventricle systolic function with an estimated ejection fraction   of 55%. No regional wall motion abnormalities are seen. Normal left ventricular diastolic filling pressures. The right ventricle is normal in size and function. Trace mitral and tricuspid valve regurgitation. Inadequate tricuspid regurgitation to estimate systolic pulmonary artery   pressure. IVC is normal in size (< 2.1 cm) and collapses > 50% with respiration   consistent with normal right atrial pressure (3 mmHg). Consults:     IP CONSULT TO CASE MANAGEMENT  IP CONSULT TO CASE MANAGEMENT  IP CONSULT TO PHYSICAL MEDICINE REHAB    Disposition:  ARU at [facility]     Condition at Discharge: Stable    Discharge Instructions/Follow-up:   - will need to go to inpatient drug rehab after d/c from IP rehab at [facility]     Code Status:  Full Code     Activity: activity as tolerated    Diet: regular diet      Discharge Medications:     Discharge Medication List as of 5/4/2022  3:45 PM           Details   Naloxone HCl (NALOXONE OPIATE OVERDOSE KIT) 1 each by Nasal route once for 1 dose, Disp-1 kit, R-0NO PRINT             Time Spent on discharge is more than 30 minutes in the examination, evaluation, counseling and review of medications and discharge plan. Signed:    Luis Loya MD   5/4/2022      Thank you No primary care provider on file. for the opportunity to be involved in this patient's care. If you have any questions or concerns, please feel free to contact me at [phone].

## 2022-05-04 NOTE — PROGRESS NOTES
Occupational Therapy  Facility/Department: [facility] C3 TELE/MED SURG/ONC  Daily Treatment Note  NAME: Jackie Doshi  : 1990  MRN: [id number]    Date of Service: 2022    Discharge Recommendations:  IP Rehab  OT Equipment Recommendations  Other: CTA- possible shower chair at this time but pt progressing. Patient Diagnosis(es): There were no encounter diagnoses. Assessment    Assessment: Pt progressing in therapy this date. Pt performing functional t/fs and standing ADLS with SBA. Pt performing functional mobility/dynamic standing balance activities with CGA-SBA with no AD with 1 LOB requiring min A to correct. Pt would continue to benefit from skilled OT services to increase strength, balance, safety and independence with ADLs and functional mobility. Activity Tolerance: Patient tolerated treatment well    Discharge Recommendations: IP Rehab    Other: CTA- possible shower chair at this time but pt progressing. Plan   Plan  Times per Week: 3-5x/week  Current Treatment Recommendations: Strengthening;Balance training;Functional mobility training; Safety education & training;Equipment evaluation, education, & procurement;Self-Care / ADL; Patient/Caregiver education & training     Restrictions  Restrictions/Precautions  Restrictions/Precautions: Up as Tolerated  Position Activity Restriction  Other position/activity restrictions: IV    Subjective   Subjective  Subjective: Pt in bed and agreeable to OT. RN approved therapy. Pain: Pt reports no pain this date. Though still some soreness in his LE with mobility    Cognition  Arousal/Alertness: Appropriate responses to stimuli  Following Commands:  Follows all commands without difficulty  Attention Span: Appears intact  Memory: Appears intact  Safety Judgement: Good awareness of safety precautions  Problem Solving: Decreased awareness of errors  Insights: Decreased awareness of deficits  Initiation: Does not require cues  Sequencing: Does not require cues        Objective    Vitals  Vitals  Pulse: 66  Heart Rate Source: Monitor  BP: (!) 153/92  BP Location: Left upper arm  Patient Position: Semi fowlers  MAP (Calculated): 112.33  SpO2: 96 %  O2 Device: None (Room air)     Bed Mobility Training  Bed Mobility Training: Yes  Overall Level of Assistance: Modified independent (HOB elevated.)  Supine to Sit: Modified independent    Balance  Standing: Impaired  Standing - Static: Good (standing EOB. No AD.)  Standing - Dynamic: Fair;Occasional (CGA-Juancho with no AD. Activity: ther ex, UB dressing, to/from restroom, self-care, reaching in various planes and heights, in emery functional mobility, standing self care. Time: 5-6minutes. 1 LOB with min A to correct during dynamic reaching activity.)    Transfer Training  Transfer Training: Yes  Overall Level of Assistance: Stand-by assistance  Sit to Stand: Stand-by assistance  Stand to Sit: Stand-by assistance     ADL  Grooming: Stand by assistance (to wash face in stance at sink.)  UE Dressing: Stand by assistance (to doff/don gown standing at EOB.)  LE Dressing: Supervision;Setup (to don/doff socks.)     OT Exercises  A/AROM Exercises: BUE in stance, x20. Shoulder flexion/extension, elbow flexion/extension, chest press. Dynamic Standing Balance Exercises: reaching for target at various heights with one UE at a time but both performing reaching movements. Target below knee level, laterally at waist level and above head. Pt reaching at all levels on ipsalateral and contralateral side of reaching UE. 1 LOB with min A to correct. Safety Devices  Type of Devices: Chair alarm in place;Gait belt;Call light within reach;Nurse notified; Left in chair     Patient Education  Education Given To: Patient  Education Provided: Role of Therapy;Plan of Care  Education Provided Comments: disease specific: general safety during hospitalization, having staff present during mobility, d/c recommendations, POC, use of call light, role of case management, ther ex. Education Method: Verbal  Education Outcome: Verbalized understanding    Goals  Short Term Goals  Time Frame for Short term goals: 1 week (5/10) unless stated otherwise. Short Term Goal 1: Pt will LB dress with min A and AD PRN. -Ongoing (5/4) TITA  Short Term Goal 2: Pt will perform bathroom mobility with SBA and AD PRN. -Ongoing (5/4) CGA  Short Term Goal 3: Pt will perform functional/toilet t/fs with SBA and AD PRN. -Ongoing (5/4) SBA for functional t/fs, TITA toilet t/fs. Short Term Goal 4: Pt will toilet with CGA and AD PRN (5/8). -Ongoing (5/4)  Patient Goals   Patient goals : \"To be able to walk without the walker\"-MET       Therapy Time   Individual Concurrent Group Co-treatment   Time In 1117         Time Out 1143         Minutes 26         Timed Code Treatment Minutes: 26 Minutes     AM PAC ADL: [id number] [address], OTR/L  If pt discharges prior to next session, this note will serve as discharge summary. See case management note for discharge disposition.

## 2022-05-04 NOTE — PLAN OF CARE
Problem: Safety - Adult  Goal: Free from fall injury  Outcome: Progressing  Flowsheets (Taken 5/4/2022 6754 by Micah Finch RN)  Free From Fall Injury: Based on caregiver fall risk screen, instruct family/caregiver to ask for assistance with transferring infant if caregiver noted to have fall risk factors

## 2022-05-04 NOTE — PROGRESS NOTES
Report given to [address] [place] [address]. PIV removed. Discharge information given. Scripts to be picked up after discharge from ARU. Tele monitor removed, [facility]U notified.

## 2022-05-04 NOTE — PROGRESS NOTES
Physician Progress Note      Meera Stapleton  CSN #:                  [id number]  :                       1990  ADMIT DATE:       2022 11:05 PM  [address] [place] DATE:  RESPONDING  PROVIDER #:        Marcia Hitchcock MD          QUERY TEXT:    Pt admitted with elevated troponin. Pt noted to have elevated troponin peaking   at 0.13 as cause of admission. If possible, please document in the progress   notes and discharge summary if you are evaluating and/or treating any of the   following: The medical record reflects the following:  Risk Factors: fentanyl overdose, WANDA, elevated CK    Clinical Indicators: per H&P- \"D/t his elevated troponin, the patient was   ultimately transferred to [facility] for further evaluation and treatment. Saranya Surinder Saranya Surinder Elevated   troponin-likely 2/2 drug use,WANDA, dehydration\"  Troponin trended- 0.10->0.13->0.06    Treatment: labs, IVF, PT/OT/CM to assist with rehab discharge planning    thank you,  Maximiliano Briceño RN, BSN  [email or web address]  Options provided:  -- Non-ischemic myocardial injury due to fentanyl overdose and WANDA  -- Elevated troponin without myocardial injury  -- Other - I will add my own diagnosis  -- Disagree - Not applicable / Not valid  -- Disagree - Clinically unable to determine / Unknown  -- Refer to Clinical Documentation Reviewer    PROVIDER RESPONSE TEXT:    This patient has non-ischemic myocardial injury due to fentanyl overdose.     Query created by: Van Vasquez on 5/3/2022 1:31 PM      Electronically signed by:  Marcia Hitchcock MD 5/3/2022 9:23 PM

## 2022-05-04 NOTE — PLAN OF CARE
Problem: Risk for Falls  Goal: Fall prevention  Description: Patient  will remain free from falls as evidenced by no witnessed or reported falls each shift during the inpatient hospice stay. Patient  and or family/caregiver will receive education on fall prevention as evidenced by verbalizing recall of using the call lights system, fall prevention devices, and asking for help during the admission process and ongoing as needed during the inpatient hospice stay.     Outcome: Progressing

## 2022-05-05 LAB
ANION GAP SERPL CALCULATED.3IONS-SCNC: 10 MMOL/L (ref 3–16)
BASOPHILS ABSOLUTE: 0 K/UL (ref 0–0.2)
BASOPHILS RELATIVE PERCENT: 0.6 %
BUN BLDV-MCNC: 10 MG/DL (ref 7–20)
CALCIUM SERPL-MCNC: 10.2 MG/DL (ref 8.3–10.6)
CHLORIDE BLD-SCNC: 104 MMOL/L (ref 99–110)
CO2: 28 MMOL/L (ref 21–32)
CREAT SERPL-MCNC: 0.8 MG/DL (ref 0.9–1.3)
EOSINOPHILS ABSOLUTE: 0.1 K/UL (ref 0–0.6)
EOSINOPHILS RELATIVE PERCENT: 1 %
GFR AFRICAN AMERICAN: >60
GFR NON-AFRICAN AMERICAN: >60
GLUCOSE BLD-MCNC: 99 MG/DL (ref 70–99)
HCT VFR BLD CALC: 44.9 % (ref 40.5–52.5)
HCV QNT BY NAAT IU/ML: ABNORMAL IU/ML
HCV QNT BY NAAT LOG IU/ML: 6.46 LOG IU/ML
HEMOGLOBIN: 15.3 G/DL (ref 13.5–17.5)
INTERPRETATION: DETECTED
LYMPHOCYTES ABSOLUTE: 2.3 K/UL (ref 1–5.1)
LYMPHOCYTES RELATIVE PERCENT: 33.5 %
MCH RBC QN AUTO: 31.2 PG (ref 26–34)
MCHC RBC AUTO-ENTMCNC: 34 G/DL (ref 31–36)
MCV RBC AUTO: 91.7 FL (ref 80–100)
MONOCYTES ABSOLUTE: 0.4 K/UL (ref 0–1.3)
MONOCYTES RELATIVE PERCENT: 6.5 %
NEUTROPHILS ABSOLUTE: 4 K/UL (ref 1.7–7.7)
NEUTROPHILS RELATIVE PERCENT: 58.4 %
PDW BLD-RTO: 13.4 % (ref 12.4–15.4)
PLATELET # BLD: 123 K/UL (ref 135–450)
PLATELET SLIDE REVIEW: ABNORMAL
PMV BLD AUTO: 11.3 FL (ref 5–10.5)
POTASSIUM REFLEX MAGNESIUM: 4.3 MMOL/L (ref 3.5–5.1)
PREALBUMIN: 14.4 MG/DL (ref 20–40)
RBC # BLD: 4.9 M/UL (ref 4.2–5.9)
SLIDE REVIEW: ABNORMAL
SODIUM BLD-SCNC: 142 MMOL/L (ref 136–145)
WBC # BLD: 6.9 K/UL (ref 4–11)

## 2022-05-05 PROCEDURE — 97166 OT EVAL MOD COMPLEX 45 MIN: CPT

## 2022-05-05 PROCEDURE — 97535 SELF CARE MNGMENT TRAINING: CPT

## 2022-05-05 PROCEDURE — 36415 COLL VENOUS BLD VENIPUNCTURE: CPT

## 2022-05-05 PROCEDURE — 97116 GAIT TRAINING THERAPY: CPT

## 2022-05-05 PROCEDURE — 97110 THERAPEUTIC EXERCISES: CPT

## 2022-05-05 PROCEDURE — 84134 ASSAY OF PREALBUMIN: CPT

## 2022-05-05 PROCEDURE — 6370000000 HC RX 637 (ALT 250 FOR IP): Performed by: STUDENT IN AN ORGANIZED HEALTH CARE EDUCATION/TRAINING PROGRAM

## 2022-05-05 PROCEDURE — 85025 COMPLETE CBC W/AUTO DIFF WBC: CPT

## 2022-05-05 PROCEDURE — 1280000000 HC REHAB R&B

## 2022-05-05 PROCEDURE — 97163 PT EVAL HIGH COMPLEX 45 MIN: CPT

## 2022-05-05 PROCEDURE — 97530 THERAPEUTIC ACTIVITIES: CPT

## 2022-05-05 PROCEDURE — 80048 BASIC METABOLIC PNL TOTAL CA: CPT

## 2022-05-05 RX ADMIN — ACETAMINOPHEN 650 MG: 325 TABLET ORAL at 20:22

## 2022-05-05 ASSESSMENT — PAIN SCALES - GENERAL
PAINLEVEL_OUTOF10: 3
PAINLEVEL_OUTOF10: 0

## 2022-05-05 ASSESSMENT — PAIN DESCRIPTION - ORIENTATION: ORIENTATION: LEFT;RIGHT

## 2022-05-05 ASSESSMENT — PAIN DESCRIPTION - LOCATION: LOCATION: LEG

## 2022-05-05 NOTE — PROGRESS NOTES
Physical Therapy  Facility/Department: [address] [place] [address]  Rehabilitation Physical Therapy Initial Assessment/Gait tx/DC note     NAME: Christiano Begum  : 1990 (28 y.o.)  MRN: [id number]  CODE STATUS: Full Code    Date of Service: 22      Past Medical History:   Diagnosis Date    Hepatitis C     Obesity     Polysubstance abuse ([address]      History reviewed. No pertinent surgical history. Restrictions:  none        SUBJECTIVE  Pain: Denies pain during session. Asks to go to bathroom at start of session. 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 OBJECTIVE  Vision  Vision: Impaired  Vision Exceptions: Wears glasses at all times (currently does not have glasses)    Hearing  Hearing: Within functional limits    Cognition  Overall Cognitive Status: WNL  Arousal/Alertness: Appropriate responses to stimuli  Following Commands: Follows all commands without difficulty  Attention Span: Appears intact  Memory: Appears intact  Safety Judgement: Good awareness of safety precautions  Problem Solving: Able to problem solve independently  Insights: Fully aware of deficits  Initiation: Does not require cues  Sequencing: Does not require cues    ROM  AROM RLE (degrees)  RLE AROM: WNL  AROM LLE (degrees)  LLE AROM : WNL    Strength  Strength RLE  Strength RLE: WNL  Comment: 5/5 BLE tested , hip flexion, abduction, knee flexion, ext, ankle DF inversion, eversion.  Standing PF 4+/5LLE and 5/5 RLE  Strength LLE  Strength LLE: WNL    Quality of Movement  Tone RLE  RLE Tone: Normotonic  Tone LLE  LLE Tone: Normotonic  Motor Control  Gross Motor?: WFL  Coordination  Rapid Alternating Movements: Normal  Heel to Shin: Normal     Sensation:Pt intact to light touch ext x4 with exception of pt noting senstaion to light touch in left plantar aspect of foot is impaired L vs R    Functional Mobility  Bed mobility  Rolling to Left: Independent  Rolling to Right: Independent  Supine to Sit: Independent  Sit to Supine: Independent  Scooting: Independent  Transfers  Sit to Stand: Independent  Stand to sit: Independent  Bed to Chair: Independent  Car Transfer: Independent  Balance  Posture: Good  Sitting - Static: Good  Sitting - Dynamic: Good  Standing - Static: Good  Standing - Dynamic: Good  Single Leg Stance R Leg: 10  Single Leg Stance L Leg:  (increased sway in L sls with progression across 4 trials from 5 seconds SLS to 10 LLE)  6 minute walk test 650 meters        Functional Gait Assessment    Patient: Pantera López  : 1990  MRN: [id number]  Date: 2022  Electronically Signed by: Migdalia Flores, PT    1. Gait level surface:    Instructions:  Walk at your normal speed from here to the next chito (20)  Grading:  Chito the lowest category that applies   [x]3 Normal:  Walks 20, no AD, good speed, no evidence for imbalance,    normal gait pattern   []2 Mild Impairment:  Walks 20, uses AD, slower speed, mild gait    deviations   []1 Moderate Impairment:  Walks 20, slow speed, abnormal gait    pattern, evidence for imbalance   []0 Severe Impairment:  Cannot walk 20 without assistance, severe    gait deviations or imbalance    2. Change in gait speed:  Instructions:  Begin walking at your normal pace (for 5), when I tell you to [facility], walk as fast as you can (for 5).   When I tell you slow, walk as slowly as you can (for 5)  Grading:  Chito the lowest category that applies  [x]3 Normal:  Able to smoothly change walking speed without loss of balance or gait deviation, shows a significant difference in walking speeds between normal, fast, and slow speeds  []2 Mild Impairment:  Is able to change speed, but demonstrates mild gait deviations or no gait deviations, but is unable to achieve a significant change in velocity, or uses an assistive device  []1 Moderate Impairment:  Makes only minor adjustments to walking speed, or accomplishes a change in speed with significant gait deviations, or changes speed, but loses significant gait deviations, or changes speed but loses balance, but is able to recover and continue walking    []0 Severe Impairment:  Cannot change speeds, or loses balance and     has to reach for wall/be caught    3. Gait with horizontal head turns:  Instructions:  Begin walking at your normal pace. When I tell you to [facility] right, keep walking straight, but turn your head to the right. Keep looking to the right until I tell you to [facility] left, then keep walking straight and turn your head to the left. Keep your head to the left until I tell you to [facility] straight, then keep walking straight, but return your head to the center  Grading:  Roshni Vázquez the lowest category that applies    [x]3 Normal:  Performs head turns smoothly with no change in gait  []2 Mild Impairment:  Performs head turns smoothly with a slight change in gait velocity (i.e. minor disruption to smooth gait path or uses AD  []1 Moderate Impairment:  Performs head turns with moderate change in gait velocity, slows down, staggers but recovers, can continue to walk  []0 Severe Impairment:  Performs head turns with severe disruption of gait (i.e. staggers, outside 15 path, loses balance, stops, reaches for wall)    4. Gait with vertical head turns:  Instructions:  Begin walking at your normal pace. When I tell you to [facility] up, keep walking straight, but tip your head up. Keep looking up until I tell you look down, then keep walking straight and tip your head down.   Keep your head down until I tell you look straight, then keep walking straight, but return your head to the center. Grading:  Chito the lowest category that applies    [x]3 Normal:  Performs head turns smoothly with no change in gait   []2 Mild Impairment:  Performs task with slight change in gait velocity (i.e. minor disruption to smooth gait path or uses AD  []1 Moderate Impairment:  Performs task with moderate change in gait velocity, slows down, staggers but recovers, can continue to walk  []0 Severe Impairment:  Performs task with severe disruption of gait (i.e. staggers, outside 15 path, loses balance, stops, reaches for wall    5. Gait and pivot turn: Instructions:  Begin walking at your normal pace. When I tell you, turn & stop, turn as quickly as you can to face the opposite direction and stop. Grading:  Chito the lowest category that applies    [x]3 Normal:  Pivot turns safely within 3 seconds and stops quickly with     no loss of balance   []2 Mild Impairment:  Pivot turns safely in > 3 seconds and stops with    no loss of balance  []1 Moderate Impairment:  Turns slowly, requires verbal cueing, requires several stops to catch balance following turn & stop  []0 Severe Impairment:  Cannot turn safely, requires assistance to turn    and stop    6. Step over obstacle: Instructions:  Begin walking at your normal speed. When you come to the shoebox, step over it, not around it, and keep walking. Grading:  Paul Delarosa the lowest category that applies    [x]3 Normal:  Is able to step over the box without changing gait speed, no    evidence of imbalance. []2 Mild Impairment:  Is able to step over the box, but must slow down and adjust steps to clear box safely. []1 Moderate Impairment:  Is able to step over box but must stop, then step over, may require verbal cueing. []0 Severe Impairment:  Cannot perform activity without assistance. 7. Gait with narrow base of support:  Instructions:  Walk on the floor with arms folded across the chest, feet aligned heel to toe in tandem for a distance of 12 feet.   The number of steps taken in a straight line are counted for a maximum of 10 steps. Grading:  Crystal Kevin the lowest category that applies    [x]3 Normal:  Is able to ambulate for 10 steps heel to toe with no staggering. []2 Mild Impairment:  Ambulates 7-9 steps  []1 Moderate Impairment:  Ambulates 4-7 steps  []0 Severe Impairment:  Ambulates less than 4 steps heel to toe or cannot perform without assistance    8. Gait with eyes closed:  Instructions:  Walk at normal speed from here to the next memo (20ft) with your eyes closed. Grading:  Memo the lowest category that applies  [x]3 Normal:  Walks 20 ft, no assistive devices, good speed, no evidence for imbalance, normal gait pattern, deviates no more than 6 in outside of 12 in walkway width. Ambulates 20 ft in less than 7 seconds. []2 Mild Impairment:  Walks 20 ft, uses assistive device, slower speed, mild gait deviations, deviates 6-10 in outside of 12 in walkway width. Ambulates 20 ft in less than 9 seconds but greater than 7 seconds. []1 Moderate Impairment:  Walks 20 ft, slow speed, abnormal gait pattern, evidence for imbalance, mild gait deviations, deviates 10-15 inches outside of 12 in walkway width. Requires more than 9 seconds to ambulate 20 ft  []0 Severe Impairment:  Cannot walk 20 ft without assistance, severe gait deviations or imbalance, deviates greater than 15 in outside of 12 in walkway width or will not attempt the task. 9. Ambulating Backwards:  Instructions:  Walk backwards until I tell you to stop. Grading:  Memo the lowest category that applies  [x]3 Normal:  Walks 20 ft, no assistive devices, good speed, no evidence for imbalance, normal gait pattern, deviates no more than 6 in outside of 12 in walkway width. []2 Mild Impairment:  Walks 20 ft, uses assistive device, slower speed, mild gait deviations, deviates 6-10 in outside of 12 in walkway width.    []1 Moderate Impairment:  Walks 20 ft, slow speed, abnormal gait pattern, evidence for imbalance, mild gait deviations, deviates 10-15 inches outside of 12 in walkway width. Requires more than 9 seconds to ambulate 20 ft  []0 Severe Impairment:  Cannot walk 20 ft without assistance, severe gait deviations or imbalance, deviates greater than 15 in outside of 12 in walkway width or will not attempt the task. 10. Steps: Instructions:  Walk up these stairs as you would at home (use railing if necessary). At the top, turn around and walk down. Grading:  Boris Neighbours the lowest category that applies   [x]3 Normal:  Alternating feet, no rail. []2 Mild Impairment:  Alternating feet, must use rail. []1 Moderate Impairment:  Two feet to a stair, must use rail  []0 Severe Impairment:  Cannot do safely    TOTAL SCORE:   30 /30    G-Code Crosswalk:  Functional Gait Assessment Score Disability Index CMS Modifier   30 0% [x]CH   25-29 1-19% []CI   19-24 20-39% []CJ   13-18 40-59% []CK   7-12 60-79% []CL   1-6 80-99% []CM   0 100% []CN        Cut-Off Scores     Community-dwelling Older Adults:   (Yamilet Upton, 2010; n = 28; aged 61 to 80, Older Adults)   Scores of ? 22/30 on the FGA were found to be effective in predicting falls, Sensitivity 85%, Specificity 86%   Scores of ? 20/30 on the FGA were optimal to predict older adults residing in community dwellings who would sustain unexplained falls in the next 6 months, Sensitivity 100%, Specificity 76%             Environmental Mobility  Ambulation  WB Status: WFL  Ambulation  Surface: level tile  Device: No Device  Assistance: Independent  500 feet and 2010 feet second walk with L/R turns , 10 feet of carpet with L/R turns, curb, indoors, outdoors and ramps. Stairs/Curb  Stairs?: Yes  Stairs  # Steps : 24  Curbs: 6\"  Assistance: Independent  Comment: first 12 SBA due to mild unsteadiness L SLS with progression to indep alt no rail last 12. Floor to stand transfers indep.   Stoop to floor to recover indep       ASSESSMENT  Vitals  Pulse: 54  BP: 135/72  BP Location: Left upper arm  BP Method: Automatic  Patient Position: Supine  MAP (Calculated): 93  SpO2: 96 %    Activity Tolerance  Activity Tolerance: Patient tolerated evaluation without incident;Patient tolerated treatment well    Assessment  Assessment: Per Dr. Onofre Waters H&P dated 5/5/2022,\"Lobito Escobedo is a 32year old male with a past medical history significant for Hepatitis C, polysubstance abuse, and obesity who presented to [facility] on 5/2/22 via EMS after fentanyl overdose. Per report that patient was found unresponsive and given 3.5 doses of Narcan by EMS. He was going to be discharged to the [facility], but was unable to walk. Lab work up was remarkable for WANDA and elevated troponin. He was transferred to [facility] for further management. He was managed with IVF. Elevated troponin was attributed to non-ischemic myocardial injury. He was admitted to [facility] on 5/4/22 due to functional deficits below his baseline. Today he is seen in his room with therapy present. He reports continued numbness on the bottom of his left foot and some impaired balance. \"  Pt evaluated for therapy and engaged in higher level balance activities and progressed from 200 feet wtih FWW to indep community distances with cane. Pt progressed from SBA up and down 12 steps with intermittent use of rail to indep up and down 12 steps with out rail. Pt demonstrated mild 4+/5 strength deficit in Left gastroc soleus with SLS  initially 5 seconds LLE and progressing to 10 seconds LLEduring session. Gait progressed from slow with decreased arm swing to normative for community distances level, indoors, outdoors, turf carpet with L/Rturns. Pt notes he has decreased sensation in plantar aspect of his foot on LLE as compared to right. Pt denies pain. Pt progressed to indep within session and with challenges with dynamic gait and balance component assessments tested in normative functional range.   Pt notes his Left gastroc muscle feels a little weaker but  notes he can, \"walk fine. \"  No further therapy needs identified. DC IRF juana. GOALS met. Pt reports he plans to DC to inpatient drug rehab program leaving from ARU. Treatment Diagnosis: recent gait abnormaility  Therapy Prognosis: Excellent  Decision Making: High Complexity  Discharge Recommendations: Home independently  PT D/C Equipment  Other: no further therapy recommended at present time. PT Equipment Recommendations  Equipment Needed: No  Other: no further therapy recommended at present time. CLINICAL IMPRESSION   Per Dr. Camryn Mendez H&P dated 5/5/2022,\"Lobito Colindres is a 32year old male with a past medical history significant for Hepatitis C, polysubstance abuse, and obesity who presented to [facility] on 5/2/22 via EMS after fentanyl overdose. Per report that patient was found unresponsive and given 3.5 doses of Narcan by EMS. He was going to be discharged to the [facility] DE[facility], but was unable to walk. Lab work up was remarkable for WANDA and elevated troponin. He was transferred to [facility] for further management. He was managed with IVF. Elevated troponin was attributed to non-ischemic myocardial injury. He was admitted to [facility] on 5/4/22 due to functional deficits below his baseline. Today he is seen in his room with therapy present. He reports continued numbness on the bottom of his left foot and some impaired balance. \"  Pt evaluated for therapy and engaged in higher level balance activities and progressed from 200 feet wtih FWW to indep community distances with cane. Pt progressed from SBA up and down 12 steps with intermittent use of rail to indep up and down 12 steps with out rail. Pt demonstrated mild 4+/5 strength deficit in Left gastroc soleus with SLS  initially 5 seconds LLE and progressing to 10 seconds LLEduring session.   Gait progressed from slow with decreased arm swing to normative for community distances level, indoors, outdoors, turf carpet with L/Rturns. Pt notes he has decreased sensation in plantar aspect of his foot on LLE as compared to right. Pt denies pain. Pt progressed to indep within session and with challenges with dynamic gait and balance component assessments tested in normative functional range. Pt notes his Left gastroc muscle feels a little weaker but  notes he can, \"walk fine. \"  No further therapy needs identified. DC IRF juana. GOALS met. Pt reports he plans to DC to inpatient drug rehab program leaving from [facility]. GOALS  Patient Goals   Patient goals : \" Go to next rehab. \"  Long Term Goals  Time Frame for Long term goals : 5/5/2022 1 day. Long term goal 1: Patient demonstrates indep with gait 1000' feet with no AD and FGI 30/30 with indep in stairs. 5/5/2022 Goal met, Patient demonstrates indep with gait 1000' feet with no AD and FGI 30/30 with indep in stairs. PLAN OF CARE  Frequency: 1-2 treatment sessions per day, 5-7 days per week  Plan  Plan weeks: Eval and 1 treatment only with progression to indep with mobility  community mobility and stairs during session  Current Treatment Recommendations: Gait training;Stair training  Safety Devices  Type of Devices: Bed alarm in place;Nurse notified; Left in bed (used gait belt during eval/tx session.)  Restraints  Restraints Initially in Place: No    ELOS:   Plan weeks: Eval and 1 treatment only with progression to indep with mobility  community mobility and stairs during session      Therapy Time   Individual Concurrent Group Co-treatment   Time In 0800         Time Out 0930         Minutes 90           Timed Code Treatment Minutes: 75 Minutes (eval 15)       Johnie Barry PT, 05/05/22 at 2:54 PM

## 2022-05-05 NOTE — PLAN OF CARE
Problem: Risk for Falls  Goal: Fall prevention  Description: Patient  will remain free from falls as evidenced by no witnessed or reported falls each shift during the inpatient hospice stay. Patient  and or family/caregiver will receive education on fall prevention as evidenced by verbalizing recall of using the call lights system, fall prevention devices, and asking for help during the admission process and ongoing as needed during the inpatient stay.     Outcome: Progressing

## 2022-05-05 NOTE — CONSULTS
Comprehensive Nutrition Assessment    Type and Reason for Visit:  Initial,Consult    Nutrition Recommendations/Plan:   1. Continue regular diet and encourage PO intake   2. RD to ensure w/ lunch  3. Monitor nutrition adequacy, pertinent labs, bowel habits, wt changes, and clinical progress     Malnutrition Assessment:  Malnutrition Status:  No malnutrition (05/05/22 1406)    Context:  Acute Illness       Nutrition Assessment:    Consult for ONS: Pt admitted w/ debility with lower extremity weakness and fentanyl overdose. New ARU patient. On regular diet. Varying PO intakes PTA. Pt reports good intake and appetite, % since admission. Pt reports stable wt, UBW= 220-225 lb. Pt willing to trial ONS, RD to add ensure w/ lunch. Continue to encourage PO intake, will continue to monitor. Nutrition Related Findings:    + BM yesterday, per pt. Labs reviewed. Wound Type: None       Current Nutrition Intake & Therapies:    Average Meal Intake: %,51-75%  Average Supplements Intake: None Ordered  ADULT DIET; Regular    Anthropometric Measures:  Height: 5' 8\" (172.7 cm)  Ideal Body Weight (IBW): 154 lbs (70 kg)       Current Body Weight: 220 lb (99.8 kg), 142.9 % IBW. Weight Source: Bed Scale  Current BMI (kg/m2): 33.5  Usual Body Weight: 220 lb (99.8 kg) (per pt)  % Weight Change (Calculated): 0                 BMI Categories: Obese Class 1 (BMI 30.0-34. 9)    Estimated Daily Nutrient Needs:  Energy Requirements Based On: Kcal/kg (25-30 kcals/kg)  Weight Used for Energy Requirements: Ideal (70 kg)  Energy (kcal/day): 0596-5911  Weight Used for Protein Requirements: Ideal (70 kg IBW, 1-1.2 g/kg)  Protein (g/day): 70-84  Method Used for Fluid Requirements: 1 ml/kcal    Nutrition Diagnosis:   · Increased nutrient needs related to increase demand for energy/nutrients as evidenced by other (comment) (Increased therapy regimen)    Nutrition Interventions:   Food and/or Nutrient Delivery: Continue Current Diet,Start Oral Nutrition Supplement  Nutrition Education/Counseling: Education not indicated  Coordination of Nutrition Care: Continue to monitor while inpatient       Goals:     Goals: PO intake 50% or greater,prior to discharge       Nutrition Monitoring and Evaluation:   Behavioral-Environmental Outcomes: None Identified  Food/Nutrient Intake Outcomes: Food and Nutrient Intake,Supplement Intake  Physical Signs/Symptoms Outcomes: Biochemical Data,Weight    Discharge Planning:    Continue current diet,Continue Oral Nutrition Supplement     Collette Cambric, [address], [address] [place] [address],   Contact: Office: [phone]; [address] [place] [address] Road: [phone]

## 2022-05-05 NOTE — CARE COORDINATION
Case Management ARU Admission Assessment     Objective:  will complete initial assessment and review the role of Case Management while on the ARU. Patient will be educated on team conferences as well as discuss family training and how it is encouraged on the unit. Order for \"discharge planning\" has been addressed. Family Present: No  Primary : Gasper San (Parent)   [phone]    Admit date:  05/04/2022        Insurance: [facility] MEDICAID    Admitting diagnosis: Debility    Current home situation: Independent prior level of function. Was currently at [facility] in [facility]. Plans on admission to [facility] DE[facility] for drug rehab. Durable Medical Equipment at home:  Walker__Cane__RTS__ BSC__Shower Chair__  02__ HHN__ CPAP__  BiPap__  Hospital Bed__ W/C___ Other__________    Meds to Beds program: Yes    Services prior to admission: none    Preference for [place] [address] or Outpatient therapy: The [address] [place] [address] Road    Patient's goal(s): Get clean    Working or Volunteering prior to admission:  __Yes _x_No                        Accessibility to community resources/transportation: [facility] will provide transport to [place]       Has patient experienced a recent loss or significant life event that would impact their care or ability to participate? _x_No  __Yes, please explain:    Has patient ever been treated for emotional disorder(s)? _x_No  __Yes, how does this affect current situation? Is patient and family coping appropriately with stressful events and this hospitalization?   _x_Yes  __No, please explain:    Support system at home and in the community: parent    Who will be the one to contact for family training: N/A    24 hour care on discharge: N/A    PCP: PCP and [facility] given     Pharmacy: [facility] to bed    Discharge plan/Summary:   spoke with patient at bedside to complete initial assessment and review the role of Case Management while on the ARU. Patient educated on team conferences as well as discuss family training and how it is encouraged on the unit. Independent prior level of function. Was currently at [facility] in [facility]. Plans on admission to [facility] DEPENDENC[facility] for drug rehab. CM spoke with [facility] and they do not accept weekend admissions d/t providing transport for the patient. CM notified Sary Alcaraz.  Case Management (CM) will continue to follow for discharge planning recommendations from the treatment team.

## 2022-05-05 NOTE — DISCHARGE INSTR - COC
Continuity of Care Form    Patient Name: Hitesh Murphy   :  1990  MRN:  [id number]    Admit date:  2022  Discharge date:  22    Code Status Order: Full Code   Advance Directives:      Admitting Physician:  Sharath Espinoza MD  PCP: No primary care provider on file. Discharging Nurse: [facility] Unit/Room#: 7682/8163-51  Discharging Unit Phone Number: [phone]    Emergency Contact:   Extended Emergency Contact Information  Primary Emergency Contact: Tiffanie Newsome  Home Phone: [phone]  Relation: Parent    Past Surgical History:  History reviewed. No pertinent surgical history. Immunization History: There is no immunization history on file for this patient. Active Problems:  Patient Active Problem List   Diagnosis Code    Tobacco use Z72.0    Obesity E66.9    WANDA (acute kidney injury) ([facility] [address] N17.9    Elevated troponin R77.8    Debility R53.81    Drug overdose, accidental or unintentional, initial encounter T50.901A    Hepatitis C B19.20    Abnormal LFTs R94.5       Isolation/Infection:   Isolation            No Isolation          Patient Infection Status       None to display            Nurse Assessment:  Last Vital Signs: /72   Pulse 54   Temp 98.3 °F (36.8 °C) (Oral)   Resp 16   Ht 5' 8\" (1.727 m)   Wt 220 lb 14.4 oz (100.2 kg)   SpO2 96%   BMI 33.59 kg/m²     Last documented pain score (0-10 scale): Pain Level: 0  Last Weight:   Wt Readings from Last 1 Encounters:   22 220 lb 14.4 oz (100.2 kg)     Mental Status:  oriented and alert    IV Access:  - None    Nursing Mobility/ADLs:  Walking   Independent  Transfer  Independent  Bathing  Independent  Dressing  Independent  Toileting  Independent  Feeding  [address]  Independent  Med Delivery   whole    Wound Care Documentation and Therapy:        Elimination:  Continence:    Bowel: Yes  Bladder: Yes  Urinary Catheter: None   Colostomy/Ileostomy/Ileal Conduit: No       Date of Last BM:     Intake/Output Summary (Last 24 hours) at 5/5/2022 1157  Last data filed at 5/5/2022 1001  Gross per 24 hour   Intake 1080 ml   Output 1380 ml   Net -300 ml     I/O last 3 completed shifts: In: 1320 [P.O.:1320]  Out: 2830 [Urine:2830]    Safety Concerns:     None    Impairments/Disabilities:      None    Nutrition Therapy:  Current Nutrition Therapy:   - Oral Diet:  General    Routes of Feeding: Oral  Liquids: Thin Liquids  Daily Fluid Restriction: no  Last Modified Barium Swallow with Video (Video Swallowing Test): not done    Treatments at the Time of Hospital Discharge:   Respiratory Treatments:   Oxygen Therapy:  is not on home oxygen therapy. Ventilator:    - No ventilator support    Rehab Therapies:   Weight Bearing Status/Restrictions: No weight bearing restrictions  Other Medical Equipment (for information only, NOT a DME order):     Other Treatments:     Patient's personal belongings (please select all that are sent with patient):  None    RN SIGNATURE:  Electronically signed by Britt Epps RN on 5/5/22 at 2:24 PM EDT    CASE MANAGEMENT/SOCIAL WORK SECTION    Inpatient Status Date: 05/04/2022    Readmission Risk Assessment Score:  Readmission Risk              Risk of Unplanned Readmission:  7       Discharging to Facility/ Agency     [facility]  [address] [facility]  [phone]    / signature: Electronically signed by Rachel Oquendo RN on 5/5/22 at 3:10 PM EDT    PHYSICIAN SECTION    Prognosis: Good    Condition at Discharge: Stable    Rehab Potential (if transferring to Rehab): Good    Recommended Labs or Other Treatments After Discharge: follow up with providers    Physician Certification: NA    Update Admission H&P: No change in H&P    PHYSICIAN SIGNATURE:  Electronically signed by Harish Diaz MD on 5/5/22 at 11:58 AM EDT

## 2022-05-05 NOTE — H&P
Patient: Elfego Anna  [id number]  Date: 5/5/2022      Chief Complaint: overdose    History of Present Illness/Hospital Course:  Elfego Anna is a 32year old male with a past medical history significant for Hepatitis C, polysubstance abuse, and obesity who presented to [facility] on 5/2/22 via EMS after fentanyl overdose. Per report that patient was found unresponsive and given 3.5 doses of Narcan by EMS. He was going to be discharged to the [facility], but was unable to walk. Lab work up was remarkable for WANDA and elevated troponin. He was transferred to [facility] for further management. He was managed with IVF. Elevated troponin was attributed to non-ischemic myocardial injury. He was admitted to [facility] on 5/4/22 due to functional deficits below his baseline. Today he is seen in his room with therapy present. He reports continued numbness on the bottom of his left foot and some impaired balance. Prior Level of Function:  Independent in self care, stairs, functional cognition, and indoor mobility    Current Level of Function:  Supervision oral hygiene, upper body dressing, sit to lying, lying to sitting on side of bed, sit to stand  Mod assist toileting hygiene  Dependent for chair/bed transfers, walk 10 feet     has a past medical history of Hepatitis C, Obesity, and Polysubstance abuse ([address] has no past surgical history on file. reports that he has been smoking. He has never used smokeless tobacco. He reports previous alcohol use. He reports current drug use. Drug: Fentanyl.    family history is not on file.     Current Facility-Administered Medications   Medication Dose Route Frequency Provider Last Rate Last Admin    acetaminophen (TYLENOL) tablet 650 mg  650 mg Oral Q6H PRN Reina Hirsch MD        Or    acetaminophen (TYLENOL) suppository 650 mg  650 mg Rectal Q6H PRN Reina Hirsch MD        enoxaparin (LOVENOX) injection 40 mg  40 mg SubCUTAneous Daily [facility] Rai Oconnor MD        nicotine (NICODERM CQ) 21 MG/24HR 1 patch  1 patch TransDERmal Daily Niharika Farley MD        ondansetron (ZOFRAN-ODT) disintegrating tablet 4 mg  4 mg Oral Q8H PRN Niharika Farley MD        Or    ondansetron [facility]) injection 4 mg  4 mg IntraVENous Q6H PRN Niharika Farley MD        polyethylene glycol [facility]) packet 17 g  17 g Oral Daily PRN Niharika Farley MD        acetaminophen (TYLENOL) tablet 650 mg  650 mg Oral Q4H PRN Niharika Farley MD        bisacodyl (DULCOLAX) suppository 10 mg  10 mg Rectal Daily PRN Niharika Farley MD           No Known Allergies    Current Facility-Administered Medications   Medication Dose Route Frequency Provider Last Rate Last Admin    acetaminophen (TYLENOL) tablet 650 mg  650 mg Oral Q6H PRN Niharika Farley MD        Or    acetaminophen (TYLENOL) suppository 650 mg  650 mg Rectal Q6H PRN Niharika Farley MD        enoxaparin (LOVENOX) injection 40 mg  40 mg SubCUTAneous Daily Niharika Farley MD        nicotine (NICODERM CQ) 21 MG/24HR 1 patch  1 patch TransDERmal Daily Niharika Farley MD        ondansetron (ZOFRAN-ODT) disintegrating tablet 4 mg  4 mg Oral Q8H PRN Niharika Farley MD        Or    ondansetron [facility]) injection 4 mg  4 mg IntraVENous Q6H PRN Niharika Farley MD        polyethylene glycol [facility]) packet 17 g  17 g Oral Daily PRN Niharika Farley MD        acetaminophen (TYLENOL) tablet 650 mg  650 mg Oral Q4H PRN Niharika Farley MD        bisacodyl (DULCOLAX) suppository 10 mg  10 mg Rectal Daily PRN Niharika Farley MD             REVIEW OF SYSTEMS:   CONSTITUTIONAL: negative for fevers, chills, diaphoresis, activity change, appetite change, fatigue, night sweats and unexpected weight change. EYES: negative for blurred vision, eye discharge, visual disturbance and icterus.    HEENT: negative for hearing loss, tinnitus, ear drainage, sinus pressure, nasal congestion, epistaxis and snoring. RESPIRATORY: Negative for hemoptysis, cough, sputum production. CARDIOVASCULAR: negative for chest pain, palpitations, exertional chest pressure/discomfort, edema, syncope. GASTROINTESTINAL: negative for nausea, vomiting, diarrhea, constipation, blood in stool and abdominal pain. GENITOURINARY: negative for frequency, dysuria, urinary incontinence, decreased urine volume, and hematuria. HEMATOLOGIC/LYMPHATIC: negative for easy bruising, bleeding and lymphadenopathy. ALLERGIC/IMMUNOLOGIC: negative for recurrent infections, angioedema, anaphylaxis and drug reactions. ENDOCRINE: negative for weight changes and diabetic symptoms including polyuria, polydipsia and polyphagia. MUSCULOSKELETAL: negative for pain, joint swelling, decreased range of motion and muscle weakness. NEUROLOGICAL: negative for headaches, slurred speech, unilateral weakness. PSYCHIATRIC/BEHAVIORAL: negative for hallucinations, behavioral problems, confusion and agitation. All pertinent positives are noted in the HPI. Physical Examination:  Vitals:   Patient Vitals for the past 24 hrs:   BP Temp Temp src Pulse Resp SpO2 Height Weight   05/05/22 0832 -- -- -- -- -- -- 5' 8\" (1.727 m) 220 lb 14.4 oz (100.2 kg)   05/05/22 0807 135/72 -- -- 54 -- 96 % -- --   05/05/22 0715 (!) 145/90 98.3 °F (36.8 °C) Oral 59 16 99 % -- --   05/04/22 2130 137/67 97.5 °F (36.4 °C) Oral 67 16 99 % -- --   05/04/22 1620 -- -- -- -- -- -- 5' 8\" (1.727 m) --   05/04/22 1600 137/87 98 °F (36.7 °C) Oral 51 16 97 % -- --     Psych: Stable mood, normal judgement, normal affect   Const: No distress  Eyes: Conjunctiva noninjected, no icterus noted; pupils equal, round, and reactive to light. HENT: Atraumatic, normocephalic; Oral mucosa moist  Neck: Trachea midline, neck supple. No thyromegaly noted. CV: extremities well perfused  Resp: Respirations unlabored.    GI: Soft, nontender, nondistended  Neuro: Alert, oriented, appropriate. No cranial nerve deficits appreciated. Sensation decreased to light touch of plantar left foot. Motor examination reveals normal strength in all four limbs diffusely. Skin: Normal temperature and turgor  MSK: No joint abnormalities noted. Ext: No significant edema appreciated. No varicosities. Lab Results   Component Value Date    WBC 6.9 05/05/2022    HGB 15.3 05/05/2022    HCT 44.9 05/05/2022    MCV 91.7 05/05/2022     (L) 05/05/2022     No results found for: INR, PROTIME  Lab Results   Component Value Date    CREATININE 0.8 (L) 05/05/2022    BUN 10 05/05/2022     05/05/2022    K 4.3 05/05/2022     05/05/2022    CO2 28 05/05/2022     Lab Results   Component Value Date     (H) 05/04/2022     (H) 05/04/2022    ALKPHOS 114 05/04/2022    BILITOT 0.5 05/04/2022     Most recent echocardiogram 5/4/22   Normal left ventricle systolic function with an estimated ejection fraction   of 55%.   No regional wall motion abnormalities are seen.   Normal left ventricular diastolic filling pressures.   The right ventricle is normal in size and function.   Trace mitral and tricuspid valve regurgitation.   Inadequate tricuspid regurgitation to estimate systolic pulmonary artery   pressure. David Montserrat is normal in size (< 2.1 cm) and collapses > 50% with respiration   consistent with normal right atrial pressure (3 mmHg).      Most recent EKG 5/2/22  Sinus tachycardiaNonspecific T wave abnormalityAbnormal ECGNo previous ECGs available     IMAGING     US Gallbladder 5/4/22  LIVER:  The liver demonstrates normal echogenicity without evidence of   intrahepatic biliary ductal dilatation.       BILIARY SYSTEM:  Gallbladder is unremarkable without evidence of   pericholecystic fluid, wall thickening or stones.  Negative sonographic   Londono's sign.       Common bile duct is within normal limits measuring 3 mm.       RIGHT KIDNEY: The right kidney is grossly unremarkable without     PANCREAS:  Visualized portions of the pancreas are unremarkable.       OTHER: No evidence of right upper quadrant ascites.      The above laboratory data have been reviewed. The above imaging data have been reviewed. The above medical testing have been reviewed. Body mass index is 33.59 kg/m². Barriers to Discharge: comorbidities    POST ADMISSION PHYSICIAN EVALUATION  The patient has agreed to being admitted to our comprehensive inpatient rehabilitation facility consisting of at least 180 minutes of therapy a day, 5 out of 7 days a week. The patient/family has a good understanding of our discharge process. The patient has potential to make improvement and is in need of at least two of the following multidisciplinary therapies including but not limited to physical, occupational, respiratory, and speech, nutritional services, wound care, and prosthetics and orthotics. Given the patients complex condition and risk of further medical complications, rehabilitation services cannot be safely provided at a lower level of care such as a skilled nursing facility. I have compared the patients medical and functional status at the time of the preadmission screening and the same on this date, and there are no significant changes. By signing this document, I acknowledge that I have personally performed a full physical examination on this patient within 24 hours of admission to this inpatient rehabilitation facility and have determined the patient to be able to tolerate the above course of treatment at an intensive level for a reasonable period of time. I will be completing a detailed individualized Plan of Care for this patient by day four of the patients stay based upon the Preadmission Screen, this Post-Admission Evaluation, and the therapy evaluations.       Rehabilitation Diagnosis:  16.0, Debility (non-cardiac, non-pulmonary    Assessment and Plan:  Allan Henao is a 32year old male with a past 32year old male with a past medical history significant for Hepatitis C, polysubstance abuse, and obesity who presented to [facility] on 5/2/22 via EMS after fentanyl overdose. He was admitted to [facility] on 5/4/22 due to functional deficits below his baseline. Debility  - due to below  - PT, OT    Lower Extremity weakness and paresthesias  - possibly due to muscle damage after being found down, possible nerve injury  - appears to be improving. Continues to have numbness on bottom of left foot  - PT, OT    Fentanyl Overdose  - required several doses of narcan  - plan to discharge to drug rehab    WANDA  - likely due to dehydration  - improved  - monitor    Non-ischemic myocardial injury  - due to overdose  - with elevated troponin on admission    Transamintis  Hepatitis C  - suspected due to drug abuse and Hep C  - follow up with GI as outpatient for possible Hep C treatment after drug rehabilitation    Obesity  - encourage lifestyle modifications    Bowels: Schedule stool softener. Follow bowel movements. Enema or suppository if needed. Bladder: Check PVR x 3. [address] if PVR > 200ml or if any volume is > 500 ml. Sleep: monitor    Follow up appointments: PCP, [facility], [address] [place] [address] [place]  Kira Short MD 5/5/2022, 11:23 AM

## 2022-05-05 NOTE — CARE COORDINATION
ROSA spoke with the  from [place] [id number] via telephone r/t patients belongings.  stated he will call the [place]. ROSA acknowledged.  Danika Wright RN

## 2022-05-05 NOTE — CARE COORDINATION
CM spoke with patient at bedside about DCP. CM discussed discharge date and destination to [facility] in [facility] and transport will be here at 10:00 am on 05/06. Patient agrees with DCP.  Susie Mcneill RN

## 2022-05-05 NOTE — PROGRESS NOTES
Occupational Therapy  Facility/Department: [id number] [facility]  Occupational Therapy Initial Assessment    Name: Fabrice Garcia  : 1990  MRN: [id number]  Date of Service: 2022    Discharge Recommendations:  Home independently,Other (comment) (D/C to inpatient drug rehab)      Patient Diagnosis(es): There were no encounter diagnoses. Past Medical History:  has a past medical history of Hepatitis C, Obesity, and Polysubstance abuse ([address] Past Surgical History:  has no past surgical history on file. Assessment   Assessment: Pt is a 27yo man admitted after medical complications d/t drug overdose resulting in LE weakness/numbness. Pt reported no pain or numbness this day and reports feeling 100%. Pt was I at baseline and currently lives in transitional housing ([facility] for inpatient drug rehab). Pt performed TB bathing, grooming and dressing this day with I. Pt demos appropriate functional strength/coordination and did not present with functional deficits this day. Pt does not require skilled functional occupational therapy at this time and is safe to d/c to inpatient rehab center without therapy. Prognosis: Good  REQUIRES OT FOLLOW-UP: Yes  Activity Tolerance  Activity Tolerance: Patient Tolerated treatment well        Plan   Plan  Times per Week: 1x only     Restrictions  Restrictions/Precautions  Restrictions/Precautions: Up as Tolerated    Subjective   General  Chart Reviewed: Yes  Patient assessed for rehabilitation services?: Yes  Family / Caregiver Present: No  Referring Practitioner: Mary Jo Isidro MD  Diagnosis: Unresponsiveness d/t fentanyl overdose  Subjective  Subjective: Pt in bed and agreeable to therapy. General Comment  Comments: RN approved therapy.      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 Leisure & Hobbies: hang out with friends  Additional Comments: Pt reports that he would most likely go to the [facility] in [place] if he were to leave [facility]. Pt has been in a transitional home for ~9 months. Pt is unsure of the layout of the home. Objective   Pulse: 54  Heart Rate Source: Monitor  BP: 135/72  BP Location: Left upper arm  BP Method: Automatic  Patient Position: Supine  MAP (Calculated): 93  Resp: 16  SpO2: 96 %  O2 Device: None (Room air)  Vision Exceptions: Wears glasses at all times (currently does not have glasses)  Hearing: Within functional limits       Observation/Palpation  Posture: Good  Palpation: BLE anterior posterior drawer and Lachman normative. varus valgus at 0 degrees and 30 degrees normal BLE and pivot shift normal BLE  Body Mechanics: Pt demo good posture seated EOB, minor LOB however good self correction at start of ambulation  Safety Devices  Type of Devices: Bed alarm in place;Nurse notified; Left in bed;Gait belt  Restraints  Restraints Initially in Place: No  Bed Mobility Training  Bed Mobility Training: Yes  Overall Level of Assistance: Modified independent  Transfer Training  Transfer Training: Yes  Overall Level of Assistance: Independent (shower t/fs and toilet t/fs)  Sit to Stand: Independent  Stand to Sit: Independent  Toilet Transfer: Independent  Toilet Transfers  Toilet - Technique: Ambulating  Equipment Used: Standard toilet  Toilet Transfer: Stand by assistance; Independent  Shower Transfers  Shower - Transfer From: [place] - Transfer Type: To and From  Shower - Transfer To: Standing  Shower - Technique: Ambulating  Shower Transfers: Independent  AROM: Within functional limits  PROM: Within functional limits  Strength:  Within functional limits  Coordination: Within functional limits  Tone: Normal  Sensation: Intact  ADL  Feeding: Independent  Grooming: Independent  UE Bathing: Independent  LE Bathing: Independent  UE Dressing: Independent  LE Dressing: Independent  Toileting: Independent        Bed mobility  Supine to Sit: Independent  Sit to Supine: Independent        Cognition  Overall Cognitive Status: WNL  Arousal/Alertness: Appropriate responses to stimuli  Following Commands: Follows all commands without difficulty  Attention Span: Appears intact  Memory: Appears intact  Safety Judgement: Good awareness of safety precautions  Problem Solving: Able to problem solve independently  Insights: Fully aware of deficits  Initiation: Does not require cues  Sequencing: Does not require cues  Cognition Comment: Pt impulsive with decreased safety awareness. A/AROM Exercises: BUE seated in chair with 5# weight, shoulder flexion/extenion/press, elbow flexion/extension, wrist flexion/extension/supination/pronation, horizontal abd/add  Education Given To: Patient  Education Provided: Role of Therapy;Plan of Care;ADL Adaptive Strategies; Energy Conservation  Education Provided Comments: disease specific: general safety during hospitalization, having staff present during mobility, d/c recommendations, POC, use of call light, role of case management, ther ex.   Education Method: Verbal  Barriers to Learning: None;Lack of Family Support  Education Outcome: Verbalized understanding        Goals  Short Term Goals  Time Frame for Short term goals: 1x eval/treat  Short Term Goal 1: Pt will demo safe performance of ADL with I-- GOAL MET 5/05  Patient Goals   Patient goals : \"to go home\" \"to get my treatment\"       Therapy Time   Individual Concurrent Group Co-treatment   Time In 1000         Time Out 1130         Minutes 90         Timed Code Treatment Minutes: 75 Minutes (15 minute eval)       Matt Walsh OT

## 2022-05-05 NOTE — DISCHARGE SUMMARY
Occupational Therapy  Discharge Summary     Name:Lobito Warner  [facility]:[id number]  :1990          Restrictions/Precautions  Restrictions/Precautions: Up as Tolerated                 Goals:   Short Term Goals  Time Frame for Short term goals: 1x eval/treat  Short Term Goal 1: Pt will demo safe performance of ADL with I-- GOAL MET         Pt. Met  short term goals and 0/0 long term goals. Pt I with all ADLs and functional mobility upon EVAL    Current Functional Status:   ADL  Feeding: Independent  Grooming: Independent  UE Bathing: Independent  LE Bathing: Independent  UE Dressing: Independent  LE Dressing: Independent  Toileting: Independent    Bed mobility  Rolling to Left: Independent  Rolling to Right: Independent  Supine to Sit: Independent  Sit to Supine: Independent  Scooting: Independent    Functional Transfers: Toilet Transfers  Toilet - Technique: Ambulating  Equipment Used: Standard toilet  Toilet Transfer: Independent         Shower Transfers  Shower - Transfer From: [place] & Emanuel - Transfer Type: To and From  Shower - Transfer To: Standing  Shower - Technique: Ambulating  Shower Transfers: Independent      Assessment:   Assessment: Pt is a 27yo man admitted after medical complications d/t drug overdose resulting in LE weakness/numbness. Pt reported no pain or numbness this day and reports feeling 100%. Pt was I at baseline and currently lives in transitional housing ([facility] center for inpatient drug rehab). Pt performed TB bathing, grooming and dressing this day with I. Pt demos appropriate functional strength/coordination and did not present with functional deficits this day. Pt does not require skilled functional occupational therapy at this time and is safe to d/c to inpatient rehab center without therapy.   Prognosis: Good     REQUIRES OT FOLLOW-UP: Yes  Discharge Recommendations: Home independently,Other (comment) (D/C to inpatient drug rehab)    Equipment Recommendations:  None         Home Exercise Program  Provided Pt with theraband exercises with silver theraband-- pt demo understanding    Electronically signed by Rudi Christian OT on 5/5/2022 at 1:17 PM

## 2022-05-06 VITALS
HEIGHT: 68 IN | WEIGHT: 220.9 LBS | HEART RATE: 68 BPM | SYSTOLIC BLOOD PRESSURE: 132 MMHG | TEMPERATURE: 97.3 F | DIASTOLIC BLOOD PRESSURE: 80 MMHG | BODY MASS INDEX: 33.48 KG/M2 | OXYGEN SATURATION: 99 % | RESPIRATION RATE: 16 BRPM

## 2022-05-06 ASSESSMENT — PAIN SCALES - GENERAL: PAINLEVEL_OUTOF10: 0

## 2022-05-06 NOTE — DISCHARGE SUMMARY
Physical Medicine & Rehabilitation  Discharge Summary     Patient Identification:  Yolanda Bravo  : 1990  Admit date: 2022  Discharge date:  22  Attending provider: Cher Smallwood MD        Primary care provider: No primary care provider on file. Discharge Diagnoses:   Patient Active Problem List   Diagnosis    Tobacco use    Obesity    WANDA (acute kidney injury) ([facility] [address]    Elevated troponin    Debility    Drug overdose, accidental or unintentional, initial encounter    Hepatitis C    Abnormal LFTs       History of Present Illness/Acute Hospital Course:  Yolanda Bravo is a 32year old male with a past medical history significant for Hepatitis C, polysubstance abuse, and obesity who presented to [facility] on 22 via EMS after fentanyl overdose. Per report that patient was found unresponsive and given 3.5 doses of Narcan by EMS. He was going to be discharged to the [facility], but was unable to walk. Lab work up was remarkable for WANDA and elevated troponin. He was transferred to [facility] for further management. He was managed with IVF. Elevated troponin was attributed to non-ischemic myocardial injury. He was admitted to [facility] on 22 due to functional deficits below his baseline. Inpatient Rehabilitation Course:   Yolanda Bravo is a 28 y.o. male admitted to inpatient rehabilitation on 2022 with Debility. The patient participated in an aggressive multidisciplinary inpatient rehabilitation program involving 3 hours of therapy per day, at least 5 days per week. He made good progress with regard to ADLs, IADLs, ambulation. Now overall independent. Discharging to [facility]. Impairments: impaired mobility and ADLs, impaired gait and balance    Medical Management:    Debility  - due to below  - PT, OT     Lower Extremity weakness and paresthesias  - possibly due to muscle damage after being found down, possible nerve injury  - appears to be improving. Continues to have numbness on bottom of left foot  - PT, OT     Fentanyl Overdose  - required several doses of narcan  - plan to discharge to drug rehab     WANDA, resolved  - likely due to dehydration  - improved  - monitor     Non-ischemic myocardial injury  - due to overdose  - with elevated troponin on admission     Transamintis  Hepatitis C  - suspected due to drug abuse and Hep C  - follow up with GI as outpatient for possible Hep C treatment after drug rehabilitation     Obesity  - encourage lifestyle modifications     Bowels: Schedule stool softener. Follow bowel movements. Enema or suppository if needed.      Bladder: Check PVR x 3. [address] if PVR > 200ml or if any volume is > 500 ml.      Sleep: monitor     Follow up appointments: PCP, [facility], GI    Discharge Exam:  Constitutional: Alert, WDWN, Pleasant, no distress  Head: Normocephalic, atruamatic, MMM  Eyes: Conjunctiva noninjected, no icterus, no drainage  Pulm: CTA bilat. Respirations non-labored. CV: No murmurs noted. RRR. Abd: Soft, nontender. NABS+  Ext: No edema, no varicosities  Neuro: Alert, fully oriented, appropriate   MSK: No joint abnormalities noted       Discharge Functional Status:    Physical therapy:    Bed Mobility: Scooting: Independent  Transfers: Sit to Stand: Independent  Stand to sit: Independent  Bed to Chair: Independent, WB Status: WFL  Ambulation  Surface: level tile  Device: Rolling Walker  Assistance: Stand by assistance  Quality of Gait: slower rate with FWW  Gait Deviations: Slow Eusebia,Increased BESSY  Distance: 160 feet  More Ambulation?: Yes, Stairs  # Steps :  (progressed from SBA and use of rail for first 12 steps to indepno rail alt pattern)  Curbs:  (indep up and down curb)  Assistance: Independent  Comment: first 12 SBA due to mild unsteadiness L SLS with progression to indep alt no rail last 12.   Mobility:  , PT Equipment Recommendations  Equipment Needed: No  Other: no further therapy recommended at present time., Assessment: Per Dr. Mitch Brito H&P dated 5/5/2022,\"Lobito Coronado is a 32year old male with a past medical history significant for Hepatitis C, polysubstance abuse, and obesity who presented to [facility] on 5/2/22 via EMS after fentanyl overdose. Per report that patient was found unresponsive and given 3.5 doses of Narcan by EMS. He was going to be discharged to the [facility], but was unable to walk. Lab work up was remarkable for WANDA and elevated troponin. He was transferred to [facility] for further management. He was managed with IVF. Elevated troponin was attributed to non-ischemic myocardial injury. He was admitted to [facility] on 5/4/22 due to functional deficits below his baseline. Today he is seen in his room with therapy present. He reports continued numbness on the bottom of his left foot and some impaired balance. \"  Pt evaluated for therapy and engaged in higher level balance activities and progressed from 200 feet wtih FWW to indep community distances with cane. Pt progressed from SBA up and down 12 steps with intermittent use of rail to indep up and down 12 steps with out rail. Pt demonstrated mild 4+/5 strength deficit in Left gastroc soleus with SLS  initially 5 seconds LLE and progressing to 10 seconds LLEduring session. Gait progressed from slow with decreased arm swing to normative for community distances level, indoors, outdoors, turf carpet with L/Rturns. Pt notes he has decreased sensation in plantar aspect of his foot on LLE as compared to right. Pt denies pain. Pt progressed to indep within session and with challenges with dynamic gait and balance component assessments tested in normative functional range. Pt notes his Left gastroc muscle feels a little weaker but  notes he can, \"walk fine. \"  No further therapy needs identified. DC IRF juana. GOALS met. Pt reports he plans to DC to inpatient drug rehab program leaving from [facility].     Occupational therapy:      , , Assessment: Pt is a 27yo man admitted after medical complications d/t drug overdose resulting in LE weakness/numbness. Pt reported no pain or numbness this day and reports feeling 100%. Pt was I at baseline and currently lives in transitional housing ([facility] for inpatient drug rehab). Pt performed TB bathing, grooming and dressing this day with I. Pt demos appropriate functional strength/coordination and did not present with functional deficits this day. Pt does not require skilled functional occupational therapy at this time and is safe to d/c to inpatient rehab center without therapy. Speech therapy:           Please refer to PT, OT, ST discharge notes. Significant Diagnostics:   Lab Results   Component Value Date    CREATININE 0.8 (L) 05/05/2022    BUN 10 05/05/2022     05/05/2022    K 4.3 05/05/2022     05/05/2022    CO2 28 05/05/2022       Lab Results   Component Value Date    WBC 6.9 05/05/2022    HGB 15.3 05/05/2022    HCT 44.9 05/05/2022    MCV 91.7 05/05/2022     (L) 05/05/2022       Disposition:  [facility]    Discharge Condition: Stable    Follow-up:  See after visit summary from hospitalization    Discharge Medications:     Medication List      You have not been prescribed any medications. I spent over 35 minutes on this discharge encounter between counseling, coordination of care, and medication reconciliation. To comply with [facility] bylaw R.II.4.1:   Discharge order placed in advance to facilitate patients discharge needs.       Belinda Granados MD

## 2022-05-06 NOTE — CARE COORDINATION
CASE MANAGEMENT DISCHARGE SUMMARY      Discharge to: [address] completed: [facility] Exemption Notification (HENS) completed: N/A    IMM given: N/A:[facility] [address] [place], [address] Equipment ordered/agency: No needs    Transportation:       Medical Transport explained to pt/family. Pt/family voice no agency preference. Agency used: [address] up time: 12:00pm   Ambulance form completed: Yes    Confirmed discharge plan with:     Patient: yes     Family:  yes    Name: Yuliana Babb (Parent)   Contact number:[phone]     Facility/Agency, name:      [facility], [address] [place], [place] [phone] faxed         RN, name: Sheryle Sill, RN        Note: Discharging nurse to complete AVINASH, reconcile AVS, and place final copy with patient's discharge packet. RN to ensure that written prescriptions for  Level II medications are sent with patient to the facility as per protocol.

## 2022-06-03 PROBLEM — R77.8 ELEVATED TROPONIN: Status: RESOLVED | Noted: 2022-05-04 | Resolved: 2022-06-03

## 2022-08-25 ENCOUNTER — HOSPITAL ENCOUNTER (OUTPATIENT)
Age: 32
Discharge: HOME | End: 2022-08-25
Payer: COMMERCIAL

## 2022-08-25 LAB
A/G RATIO: 1.3 (ref 1.1–2.2)
ALBUMIN SERPL-MCNC: 4.5 G/DL (ref 3.4–5)
ALP BLD-CCNC: 106 U/L (ref 40–129)
ALT SERPL-CCNC: 29 U/L (ref 10–40)
ANION GAP SERPL CALCULATED.3IONS-SCNC: 11 MMOL/L (ref 3–16)
AST SERPL-CCNC: 31 U/L (ref 15–37)
BILIRUB SERPL-MCNC: 0.3 MG/DL (ref 0–1)
BUN BLDV-MCNC: 10 MG/DL (ref 7–20)
CALCIUM SERPL-MCNC: 9.8 MG/DL (ref 8.3–10.6)
CHLORIDE BLD-SCNC: 104 MMOL/L (ref 99–110)
CO2: 25 MMOL/L (ref 21–32)
CREAT SERPL-MCNC: 0.9 MG/DL (ref 0.9–1.3)
GFR AFRICAN AMERICAN: >60
GFR NON-AFRICAN AMERICAN: >60
GLUCOSE BLD-MCNC: 101 MG/DL (ref 70–99)
POTASSIUM SERPL-SCNC: 4.7 MMOL/L (ref 3.5–5.1)
SODIUM BLD-SCNC: 140 MMOL/L (ref 136–145)
TOTAL PROTEIN: 8.1 G/DL (ref 6.4–8.2)

## 2022-08-25 PROCEDURE — 80053 COMPREHEN METABOLIC PANEL: CPT

## 2022-08-25 PROCEDURE — 80074 ACUTE HEPATITIS PANEL: CPT

## 2022-08-26 LAB
HAV IGM SER IA-ACNC: ABNORMAL
HEPATITIS B CORE IGM ANTIBODY: ABNORMAL
HEPATITIS B SURFACE ANTIGEN INTERPRETATION: ABNORMAL
HEPATITIS C ANTIBODY INTERPRETATION: REACTIVE